# Patient Record
Sex: MALE | Race: WHITE | NOT HISPANIC OR LATINO | URBAN - METROPOLITAN AREA
[De-identification: names, ages, dates, MRNs, and addresses within clinical notes are randomized per-mention and may not be internally consistent; named-entity substitution may affect disease eponyms.]

---

## 2024-11-13 NOTE — PROGRESS NOTES
Per Salas  1990        Subjective     Chief Complaint: Establish Care      History of Present Illness:  Mr. Per Salas is a 34 y.o. male who presents to clinic for establishing care.        History of Present Illness     CHIEF COMPLAINT:  Mr. Salas presents for an annual wellness visit and to establish primary care for ongoing health monitoring.    HPI:  Mr. Salas reports shortness of breath with physical exertion, which he attributes to being overweight. He denies any other current health issues or past medical conditions, except for having ear tubes as a child. His parents have encouraged him to establish primary care to monitor his overall health.    MEDICAL HISTORY:  Mr. Salas has a history of ear tubes placement as a child. Mr. Salas is sexually active and currently uses condoms for contraception. Mr. Salas receives flu vaccines through work.    FAMILY HISTORY:  Family history is significant for father having a myocardial infarction at age 50. His mother has a history of breast cancer. Mr. Salas's maternal uncle has a history of colon cancer, and his maternal grandmother has a history of ovarian cancer.    SURGICAL HISTORY:  Mr. Salas underwent ear tube placement as a child.    SOCIAL HISTORY:  Mr. Salas denies smoking, alcohol consumption, and drug use. He is sexually active with males and uses protection.              Review of Systems   Constitutional:  Negative for fever.   HENT:  Negative for sore throat.    Respiratory:  Negative for shortness of breath.    Cardiovascular:  Negative for chest pain, palpitations and leg swelling.   Gastrointestinal:  Negative for abdominal pain, blood in stool, constipation, diarrhea, nausea and vomiting.   Genitourinary:  Negative for dysuria and hematuria.        PAST HISTORY:     No past medical history on file.    No past surgical history on file.    Family History   Problem Relation Name Age of Onset    Breast cancer Mother      Heart attack  Father      Ovarian cancer Maternal Grandmother      Colon cancer Maternal Uncle         Social History     Socioeconomic History    Marital status: Single   Tobacco Use    Smoking status: Never    Smokeless tobacco: Never   Substance and Sexual Activity    Alcohol use: Not Currently    Drug use: Never    Sexual activity: Yes     Partners: Male     Birth control/protection: Condom     Comment: monogamous     Social Drivers of Health     Financial Resource Strain: Low Risk  (11/8/2024)    Overall Financial Resource Strain (CARDIA)     Difficulty of Paying Living Expenses: Not hard at all   Food Insecurity: No Food Insecurity (11/8/2024)    Hunger Vital Sign     Worried About Running Out of Food in the Last Year: Never true     Ran Out of Food in the Last Year: Never true   Physical Activity: Insufficiently Active (11/8/2024)    Exercise Vital Sign     Days of Exercise per Week: 2 days     Minutes of Exercise per Session: 30 min   Stress: No Stress Concern Present (11/8/2024)    [employer] [place] [employer] - Occupational Stress Questionnaire     Feeling of Stress : Only a little   Housing Stability: Unknown (11/8/2024)    Housing Stability Vital Sign     Unable to Pay for Housing in the Last Year: No       MEDICATIONS & ALLERGIES:     No current outpatient medications on file prior to visit.     No current facility-administered medications on file prior to visit.       Review of patient's allergies indicates:  No Known Allergies    OBJECTIVE:     Vital Signs:  Vitals:    11/15/24 0849   BP: 112/82   BP Location: Right arm   Patient Position: Sitting   Pulse: 80   Resp: 18   Temp: 98.3 °F (36.8 °C)   TempSrc: Oral   SpO2: 98%   Weight: 129.8 kg (286 lb 2.5 oz)   Height: 6' (1.829 m)       Body mass index is 38.81 kg/m².     Physical Exam:  Physical Exam  Vitals and nursing note reviewed.   Constitutional:       General: He is not in acute distress.     Appearance: He is obese. He is not ill-appearing.  "  HENT:      Head: Normocephalic and atraumatic.      Mouth/Throat:      Mouth: Mucous membranes are moist.      Pharynx: Oropharynx is clear. No oropharyngeal exudate or posterior oropharyngeal erythema.   Eyes:      Extraocular Movements: Extraocular movements intact.      Conjunctiva/sclera: Conjunctivae normal.   Cardiovascular:      Rate and Rhythm: Normal rate and regular rhythm.   Pulmonary:      Effort: Pulmonary effort is normal. No respiratory distress.      Breath sounds: Normal breath sounds. No wheezing or rales.   Chest:      Chest wall: No tenderness.   Abdominal:      Palpations: Abdomen is soft.      Tenderness: There is no abdominal tenderness. There is no guarding.   Musculoskeletal:         General: Normal range of motion.      Cervical back: Normal range of motion and neck supple. No tenderness.      Right lower leg: No edema.      Left lower leg: No edema.   Lymphadenopathy:      Cervical: No cervical adenopathy.   Skin:     General: Skin is warm and dry.   Neurological:      Mental Status: He is alert and oriented to person, place, and time.            Laboratory  No results found for: "WBC", "HGB", "HCT", "MCV", "PLT"  No results found for: "GLU", "NA", "K", "CL", "CO2", "BUN", "CREATININE", "CALCIUM", "MG"  No results found for: "INR", "PROTIME"  No results found for: "HGBA1C"  No results for input(s): "POCTGLUCOSE" in the last 72 hours.      Health Maintenance         Date Due Completion Date    Hepatitis C Screening Never done ---    Lipid Panel Never done ---    HIV Screening Never done ---    TETANUS VACCINE Never done ---    COVID-19 Vaccine (4 - 2024-25 season) 09/01/2024 9/30/2021    RSV Vaccine (Age 60+ and Pregnant patients) (1 - 1-dose 75+ series) 03/14/2067 ---            ASSESSMENT & PLAN:   34 y.o. male who was seen today in clinic for establishing care    Annual physical exam  -     Hemoglobin A1C; Future; Expected date: 11/15/2024  -     CBC Without Differential; Future; " Expected date: 11/15/2024  -     Comprehensive Metabolic Panel; Future; Expected date: 11/15/2024  -     TSH; Future; Expected date: 11/15/2024  -     Lipid Panel; Future; Expected date: 11/15/2024  -     Hepatitis C Antibody; Future; Expected date: 11/15/2024  -     HIV 1/2 Ag/Ab (4th Gen); Future; Expected date: 11/15/2024  -     DIPH,PERTUSS(ACEL),TET VAC(PF)(ADULT)(ADACEL)(TDaP)    Class 2 obesity without serious comorbidity with body mass index (BMI) of 38.0 to 38.9 in adult, unspecified obesity type         1. Annual physical exam    2. Class 2 obesity without serious comorbidity with body mass index (BMI) of 38.0 to 38.9 in adult, unspecified obesity type      Fasting labs ordered. Tdap vaccine today.   Discussed importance of diet and exercise.      Assessment & Plan    Assessed patient with no significant current health issues  Considered family history of cardiovascular disease and various cancers    GENERAL ADULT MEDICAL EXAMINATION:  CBC, CMP, cholesterol panel, A1C, hepatitis C screening, HIV screening, and TSH ordered to establish health baseline and screen for potential issues.    WEIGHT MANAGEMENT:  Discussed the importance of diet and exercise for weight management.  Mr. Salas to focus on diet and exercise for weight management.    IMMUNIZATION:  Tetanus vaccine administered.    FOLLOW-UP:  Follow up in 1 year, unless lab results indicate need for earlier appointment.  Contact the office if any issues arise from lab results.             RTC in 1 year or sooner if needed    Juan Carlos Garcia MD  [facility]    This note was generated with the assistance of ambient listening technology. Verbal consent was obtained by the patient and accompanying visitor(s) for the recording of patient appointment to facilitate this note. I attest to having reviewed and edited the generated note for accuracy, though some syntax or spelling errors may persist. Please contact the author of this note for any  clarification.

## 2024-11-15 ENCOUNTER — OFFICE VISIT (OUTPATIENT)
Dept: INTERNAL MEDICINE | Facility: CLINIC | Age: 34
End: 2024-11-15
Payer: COMMERCIAL

## 2024-11-15 ENCOUNTER — LAB VISIT (OUTPATIENT)
Dept: LAB | Facility: HOSPITAL | Age: 34
End: 2024-11-15
Payer: COMMERCIAL

## 2024-11-15 VITALS
HEART RATE: 80 BPM | BODY MASS INDEX: 38.76 KG/M2 | SYSTOLIC BLOOD PRESSURE: 112 MMHG | TEMPERATURE: 98 F | WEIGHT: 286.19 LBS | OXYGEN SATURATION: 98 % | RESPIRATION RATE: 18 BRPM | HEIGHT: 72 IN | DIASTOLIC BLOOD PRESSURE: 82 MMHG

## 2024-11-15 DIAGNOSIS — Z00.00 ANNUAL PHYSICAL EXAM: ICD-10-CM

## 2024-11-15 DIAGNOSIS — Z00.00 ANNUAL PHYSICAL EXAM: Primary | ICD-10-CM

## 2024-11-15 DIAGNOSIS — E66.812 CLASS 2 OBESITY WITHOUT SERIOUS COMORBIDITY WITH BODY MASS INDEX (BMI) OF 38.0 TO 38.9 IN ADULT, UNSPECIFIED OBESITY TYPE: ICD-10-CM

## 2024-11-15 LAB
ALBUMIN SERPL BCP-MCNC: 4.1 G/DL (ref 3.5–5.2)
ALP SERPL-CCNC: 82 U/L (ref 40–150)
ALT SERPL W/O P-5'-P-CCNC: 35 U/L (ref 10–44)
ANION GAP SERPL CALC-SCNC: 8 MMOL/L (ref 8–16)
AST SERPL-CCNC: 23 U/L (ref 10–40)
BILIRUB SERPL-MCNC: 0.4 MG/DL (ref 0.1–1)
BUN SERPL-MCNC: 17 MG/DL (ref 6–20)
CALCIUM SERPL-MCNC: 9.3 MG/DL (ref 8.7–10.5)
CHLORIDE SERPL-SCNC: 107 MMOL/L (ref 95–110)
CHOLEST SERPL-MCNC: 207 MG/DL (ref 120–199)
CHOLEST/HDLC SERPL: 6.1 {RATIO} (ref 2–5)
CO2 SERPL-SCNC: 24 MMOL/L (ref 23–29)
CREAT SERPL-MCNC: 1.4 MG/DL (ref 0.5–1.4)
ERYTHROCYTE [DISTWIDTH] IN BLOOD BY AUTOMATED COUNT: 12.8 % (ref 11.5–14.5)
EST. GFR  (NO RACE VARIABLE): >60 ML/MIN/1.73 M^2
ESTIMATED AVG GLUCOSE: 114 MG/DL (ref 68–131)
GLUCOSE SERPL-MCNC: 101 MG/DL (ref 70–110)
HBA1C MFR BLD: 5.6 % (ref 4–5.6)
HCT VFR BLD AUTO: 48.8 % (ref 40–54)
HCV AB SERPL QL IA: NORMAL
HDLC SERPL-MCNC: 34 MG/DL (ref 40–75)
HDLC SERPL: 16.4 % (ref 20–50)
HGB BLD-MCNC: 15.1 G/DL (ref 14–18)
HIV 1+2 AB+HIV1 P24 AG SERPL QL IA: NORMAL
LDLC SERPL CALC-MCNC: 143 MG/DL (ref 63–159)
MCH RBC QN AUTO: 28 PG (ref 27–31)
MCHC RBC AUTO-ENTMCNC: 30.9 G/DL (ref 32–36)
MCV RBC AUTO: 91 FL (ref 82–98)
NONHDLC SERPL-MCNC: 173 MG/DL
PLATELET # BLD AUTO: 234 K/UL (ref 150–450)
PMV BLD AUTO: 11.1 FL (ref 9.2–12.9)
POTASSIUM SERPL-SCNC: 4.3 MMOL/L (ref 3.5–5.1)
PROT SERPL-MCNC: 7.5 G/DL (ref 6–8.4)
RBC # BLD AUTO: 5.39 M/UL (ref 4.6–6.2)
SODIUM SERPL-SCNC: 139 MMOL/L (ref 136–145)
TRIGL SERPL-MCNC: 150 MG/DL (ref 30–150)
TSH SERPL DL<=0.005 MIU/L-ACNC: 0.81 UIU/ML (ref 0.4–4)
WBC # BLD AUTO: 5.94 K/UL (ref 3.9–12.7)

## 2024-11-15 PROCEDURE — 87389 HIV-1 AG W/HIV-1&-2 AB AG IA: CPT

## 2024-11-15 PROCEDURE — 86803 HEPATITIS C AB TEST: CPT

## 2024-11-15 PROCEDURE — 99999 PR PBB SHADOW E&M-EST. PATIENT-LVL III: CPT | Mod: PBBFAC,,,

## 2024-11-15 PROCEDURE — 80061 LIPID PANEL: CPT

## 2024-11-15 PROCEDURE — 84443 ASSAY THYROID STIM HORMONE: CPT

## 2024-11-15 PROCEDURE — 36415 COLL VENOUS BLD VENIPUNCTURE: CPT | Mod: PO

## 2024-11-15 PROCEDURE — 80053 COMPREHEN METABOLIC PANEL: CPT

## 2024-11-15 PROCEDURE — 83036 HEMOGLOBIN GLYCOSYLATED A1C: CPT

## 2024-11-15 PROCEDURE — 85027 COMPLETE CBC AUTOMATED: CPT

## 2024-12-17 ENCOUNTER — HOSPITAL ENCOUNTER (OUTPATIENT)
Facility: HOSPITAL | Age: 34
Discharge: HOME | End: 2024-12-18
Attending: STUDENT IN AN ORGANIZED HEALTH CARE EDUCATION/TRAINING PROGRAM | Admitting: UROLOGY
Payer: COMMERCIAL

## 2024-12-17 DIAGNOSIS — N20.1 URETERAL STONE: Primary | ICD-10-CM

## 2024-12-17 PROBLEM — N17.9 AKI (ACUTE KIDNEY INJURY): Status: ACTIVE | Noted: 2024-12-17

## 2024-12-17 LAB
ALBUMIN SERPL BCP-MCNC: 4.1 G/DL (ref 3.5–5.2)
ALP SERPL-CCNC: 82 U/L (ref 40–150)
ALT SERPL W/O P-5'-P-CCNC: 32 U/L (ref 10–44)
ANION GAP SERPL CALC-SCNC: 9 MMOL/L (ref 8–16)
AST SERPL-CCNC: 25 U/L (ref 10–40)
BACTERIA #/AREA URNS AUTO: ABNORMAL /HPF
BASOPHILS # BLD AUTO: 0.01 K/UL (ref 0–0.2)
BASOPHILS NFR BLD: 0.1 % (ref 0–1.9)
BILIRUB SERPL-MCNC: 0.5 MG/DL (ref 0.1–1)
BILIRUB UR QL STRIP: NEGATIVE
BUN SERPL-MCNC: 15 MG/DL (ref 6–20)
CALCIUM SERPL-MCNC: 9.2 MG/DL (ref 8.7–10.5)
CHLORIDE SERPL-SCNC: 107 MMOL/L (ref 95–110)
CLARITY UR REFRACT.AUTO: CLEAR
CO2 SERPL-SCNC: 23 MMOL/L (ref 23–29)
COLOR UR AUTO: YELLOW
CREAT SERPL-MCNC: 2 MG/DL (ref 0.5–1.4)
DIFFERENTIAL METHOD BLD: ABNORMAL
EOSINOPHIL # BLD AUTO: 0 K/UL (ref 0–0.5)
EOSINOPHIL NFR BLD: 0.1 % (ref 0–8)
ERYTHROCYTE [DISTWIDTH] IN BLOOD BY AUTOMATED COUNT: 12.7 % (ref 11.5–14.5)
EST. GFR  (NO RACE VARIABLE): 44.6 ML/MIN/1.73 M^2
GLUCOSE SERPL-MCNC: 96 MG/DL (ref 70–110)
GLUCOSE UR QL STRIP: NEGATIVE
HCT VFR BLD AUTO: 46.6 % (ref 40–54)
HGB BLD-MCNC: 15.3 G/DL (ref 14–18)
HGB UR QL STRIP: ABNORMAL
IMM GRANULOCYTES # BLD AUTO: 0.03 K/UL (ref 0–0.04)
IMM GRANULOCYTES NFR BLD AUTO: 0.3 % (ref 0–0.5)
KETONES UR QL STRIP: ABNORMAL
LEUKOCYTE ESTERASE UR QL STRIP: NEGATIVE
LYMPHOCYTES # BLD AUTO: 1.8 K/UL (ref 1–4.8)
LYMPHOCYTES NFR BLD: 15.4 % (ref 18–48)
MCH RBC QN AUTO: 29.2 PG (ref 27–31)
MCHC RBC AUTO-ENTMCNC: 32.8 G/DL (ref 32–36)
MCV RBC AUTO: 89 FL (ref 82–98)
MICROSCOPIC COMMENT: ABNORMAL
MONOCYTES # BLD AUTO: 0.8 K/UL (ref 0.3–1)
MONOCYTES NFR BLD: 6.6 % (ref 4–15)
NEUTROPHILS # BLD AUTO: 8.9 K/UL (ref 1.8–7.7)
NEUTROPHILS NFR BLD: 77.5 % (ref 38–73)
NITRITE UR QL STRIP: NEGATIVE
NON-SQ EPI CELLS #/AREA URNS AUTO: 0 /HPF
NRBC BLD-RTO: 0 /100 WBC
PH UR STRIP: 8 [PH] (ref 5–8)
PLATELET # BLD AUTO: 219 K/UL (ref 150–450)
PMV BLD AUTO: 11 FL (ref 9.2–12.9)
POTASSIUM SERPL-SCNC: 4.2 MMOL/L (ref 3.5–5.1)
PROT SERPL-MCNC: 7.2 G/DL (ref 6–8.4)
PROT UR QL STRIP: ABNORMAL
RBC # BLD AUTO: 5.24 M/UL (ref 4.6–6.2)
RBC #/AREA URNS AUTO: 22 /HPF (ref 0–4)
SODIUM SERPL-SCNC: 139 MMOL/L (ref 136–145)
SP GR UR STRIP: 1.02 (ref 1–1.03)
SQUAMOUS #/AREA URNS AUTO: 0 /HPF
URN SPEC COLLECT METH UR: ABNORMAL
WBC # BLD AUTO: 11.43 K/UL (ref 3.9–12.7)
WBC #/AREA URNS AUTO: 1 /HPF (ref 0–5)

## 2024-12-17 PROCEDURE — 85025 COMPLETE CBC W/AUTO DIFF WBC: CPT | Performed by: EMERGENCY MEDICINE

## 2024-12-17 PROCEDURE — G0378 HOSPITAL OBSERVATION PER HR: HCPCS

## 2024-12-17 PROCEDURE — 96374 THER/PROPH/DIAG INJ IV PUSH: CPT

## 2024-12-17 PROCEDURE — 96375 TX/PRO/DX INJ NEW DRUG ADDON: CPT

## 2024-12-17 PROCEDURE — 99285 EMERGENCY DEPT VISIT HI MDM: CPT | Mod: 25

## 2024-12-17 PROCEDURE — 63600175 PHARM REV CODE 636 W HCPCS

## 2024-12-17 PROCEDURE — 81001 URINALYSIS AUTO W/SCOPE: CPT | Performed by: EMERGENCY MEDICINE

## 2024-12-17 PROCEDURE — 96361 HYDRATE IV INFUSION ADD-ON: CPT

## 2024-12-17 PROCEDURE — 25000003 PHARM REV CODE 250

## 2024-12-17 PROCEDURE — 80053 COMPREHEN METABOLIC PANEL: CPT | Performed by: EMERGENCY MEDICINE

## 2024-12-17 PROCEDURE — 99223 1ST HOSP IP/OBS HIGH 75: CPT | Mod: ,,, | Performed by: UROLOGY

## 2024-12-17 RX ORDER — SODIUM CHLORIDE, SODIUM LACTATE, POTASSIUM CHLORIDE, CALCIUM CHLORIDE 600; 310; 30; 20 MG/100ML; MG/100ML; MG/100ML; MG/100ML
INJECTION, SOLUTION INTRAVENOUS CONTINUOUS
Status: DISCONTINUED | OUTPATIENT
Start: 2024-12-17 | End: 2024-12-18 | Stop reason: HOSPADM

## 2024-12-17 RX ORDER — DIPHENHYDRAMINE HCL 25 MG
25 CAPSULE ORAL EVERY 12 HOURS PRN
Status: DISCONTINUED | OUTPATIENT
Start: 2024-12-17 | End: 2024-12-18 | Stop reason: HOSPADM

## 2024-12-17 RX ORDER — ONDANSETRON HYDROCHLORIDE 2 MG/ML
4 INJECTION, SOLUTION INTRAVENOUS
Status: COMPLETED | OUTPATIENT
Start: 2024-12-17 | End: 2024-12-17

## 2024-12-17 RX ORDER — ONDANSETRON HYDROCHLORIDE 2 MG/ML
4 INJECTION, SOLUTION INTRAVENOUS EVERY 6 HOURS PRN
Status: DISCONTINUED | OUTPATIENT
Start: 2024-12-17 | End: 2024-12-18 | Stop reason: HOSPADM

## 2024-12-17 RX ORDER — SODIUM CHLORIDE 0.9 % (FLUSH) 0.9 %
10 SYRINGE (ML) INJECTION
Status: DISCONTINUED | OUTPATIENT
Start: 2024-12-17 | End: 2024-12-18 | Stop reason: HOSPADM

## 2024-12-17 RX ORDER — TALC
6 POWDER (GRAM) TOPICAL NIGHTLY PRN
Status: DISCONTINUED | OUTPATIENT
Start: 2024-12-17 | End: 2024-12-18 | Stop reason: HOSPADM

## 2024-12-17 RX ORDER — ACETAMINOPHEN 325 MG/1
650 TABLET ORAL EVERY 6 HOURS PRN
Status: DISCONTINUED | OUTPATIENT
Start: 2024-12-17 | End: 2024-12-18 | Stop reason: HOSPADM

## 2024-12-17 RX ORDER — MORPHINE SULFATE 4 MG/ML
4 INJECTION, SOLUTION INTRAMUSCULAR; INTRAVENOUS ONCE AS NEEDED
Status: COMPLETED | OUTPATIENT
Start: 2024-12-17 | End: 2024-12-17

## 2024-12-17 RX ORDER — TAMSULOSIN HYDROCHLORIDE 0.4 MG/1
0.4 CAPSULE ORAL NIGHTLY
Status: DISCONTINUED | OUTPATIENT
Start: 2024-12-17 | End: 2024-12-18 | Stop reason: HOSPADM

## 2024-12-17 RX ORDER — KETOROLAC TROMETHAMINE 30 MG/ML
10 INJECTION, SOLUTION INTRAMUSCULAR; INTRAVENOUS ONCE AS NEEDED
Status: COMPLETED | OUTPATIENT
Start: 2024-12-17 | End: 2024-12-17

## 2024-12-17 RX ORDER — OXYCODONE HYDROCHLORIDE 5 MG/1
5 TABLET ORAL EVERY 4 HOURS PRN
Status: DISCONTINUED | OUTPATIENT
Start: 2024-12-17 | End: 2024-12-18 | Stop reason: HOSPADM

## 2024-12-17 RX ADMIN — SODIUM CHLORIDE 1000 ML: 9 INJECTION, SOLUTION INTRAVENOUS at 06:12

## 2024-12-17 RX ADMIN — KETOROLAC TROMETHAMINE 10 MG: 30 INJECTION, SOLUTION INTRAMUSCULAR; INTRAVENOUS at 08:12

## 2024-12-17 RX ADMIN — SODIUM CHLORIDE, POTASSIUM CHLORIDE, SODIUM LACTATE AND CALCIUM CHLORIDE: 600; 310; 30; 20 INJECTION, SOLUTION INTRAVENOUS at 10:12

## 2024-12-17 RX ADMIN — MORPHINE SULFATE 4 MG: 4 INJECTION INTRAVENOUS at 07:12

## 2024-12-17 RX ADMIN — TAMSULOSIN HYDROCHLORIDE 0.4 MG: 0.4 CAPSULE ORAL at 09:12

## 2024-12-17 RX ADMIN — ONDANSETRON 4 MG: 2 INJECTION INTRAMUSCULAR; INTRAVENOUS at 06:12

## 2024-12-17 NOTE — FIRST PROVIDER EVALUATION
Medical screening examination initiated.  I have conducted a focused provider triage encounter, findings are as follows:    Brief history of present illness:  Sudden onset left flank pain, nausea, vomiting. Similar to prior kidney stone but worse than before    There were no vitals filed for this visit.    Pertinent physical exam:  Uncomfortable appearing, NAD    Brief workup plan:  Analgesia, labs, ct    Preliminary workup initiated; this workup will be continued and followed by the physician or advanced practice provider that is assigned to the patient when roomed.

## 2024-12-17 NOTE — ED TRIAGE NOTES
Per Salas, a 34 y.o. male presents to the ED w/ complaint of left flank pain, started this morning    Triage note:  Chief Complaint   Patient presents with    Flank Pain     L flank pain, vomited hx kidney stones     Review of patient's allergies indicates:  No Known Allergies  No past medical history on file.      APPEARANCE: awake and alert in NAD. PAIN  7/10  SKIN: warm, dry and intact. No breakdown or bruising.  MUSCULOSKELETAL: Patient moving all extremities spontaneously, no obvious swelling or deformities noted. Ambulates independently.  RESPIRATORY: Denies shortness of breath.Respirations unlabored.   CARDIAC: Denies CP, 2+ distal pulses; no peripheral edema  ABDOMEN: S/ND/NT, endorses nausea  : voids spontaneously, denies difficulty  Neurologic: AAO x 4; follows commands equal strength in all extremities; denies numbness/tingling. Denies dizziness

## 2024-12-18 ENCOUNTER — ANESTHESIA (OUTPATIENT)
Dept: SURGERY | Facility: HOSPITAL | Age: 34
End: 2024-12-18
Payer: COMMERCIAL

## 2024-12-18 ENCOUNTER — ANESTHESIA EVENT (OUTPATIENT)
Dept: SURGERY | Facility: HOSPITAL | Age: 34
End: 2024-12-18
Payer: COMMERCIAL

## 2024-12-18 VITALS
RESPIRATION RATE: 18 BRPM | DIASTOLIC BLOOD PRESSURE: 83 MMHG | WEIGHT: 259.94 LBS | BODY MASS INDEX: 35.21 KG/M2 | TEMPERATURE: 98 F | SYSTOLIC BLOOD PRESSURE: 131 MMHG | HEART RATE: 70 BPM | HEIGHT: 72 IN | OXYGEN SATURATION: 98 %

## 2024-12-18 LAB
ANION GAP SERPL CALC-SCNC: 6 MMOL/L (ref 8–16)
BASOPHILS # BLD AUTO: 0.02 K/UL (ref 0–0.2)
BASOPHILS NFR BLD: 0.2 % (ref 0–1.9)
BUN SERPL-MCNC: 15 MG/DL (ref 6–20)
CALCIUM SERPL-MCNC: 8.7 MG/DL (ref 8.7–10.5)
CHLORIDE SERPL-SCNC: 108 MMOL/L (ref 95–110)
CO2 SERPL-SCNC: 25 MMOL/L (ref 23–29)
CREAT SERPL-MCNC: 1.9 MG/DL (ref 0.5–1.4)
DIFFERENTIAL METHOD BLD: ABNORMAL
EOSINOPHIL # BLD AUTO: 0.1 K/UL (ref 0–0.5)
EOSINOPHIL NFR BLD: 0.8 % (ref 0–8)
ERYTHROCYTE [DISTWIDTH] IN BLOOD BY AUTOMATED COUNT: 13 % (ref 11.5–14.5)
EST. GFR  (NO RACE VARIABLE): 47.5 ML/MIN/1.73 M^2
GLUCOSE SERPL-MCNC: 84 MG/DL (ref 70–110)
HCT VFR BLD AUTO: 45.1 % (ref 40–54)
HGB BLD-MCNC: 14.2 G/DL (ref 14–18)
IMM GRANULOCYTES # BLD AUTO: 0.02 K/UL (ref 0–0.04)
IMM GRANULOCYTES NFR BLD AUTO: 0.2 % (ref 0–0.5)
LYMPHOCYTES # BLD AUTO: 1.7 K/UL (ref 1–4.8)
LYMPHOCYTES NFR BLD: 21.1 % (ref 18–48)
MCH RBC QN AUTO: 28.5 PG (ref 27–31)
MCHC RBC AUTO-ENTMCNC: 31.5 G/DL (ref 32–36)
MCV RBC AUTO: 90 FL (ref 82–98)
MONOCYTES # BLD AUTO: 0.7 K/UL (ref 0.3–1)
MONOCYTES NFR BLD: 9 % (ref 4–15)
NEUTROPHILS # BLD AUTO: 5.7 K/UL (ref 1.8–7.7)
NEUTROPHILS NFR BLD: 68.7 % (ref 38–73)
NRBC BLD-RTO: 0 /100 WBC
PLATELET # BLD AUTO: 176 K/UL (ref 150–450)
PMV BLD AUTO: 11.1 FL (ref 9.2–12.9)
POTASSIUM SERPL-SCNC: 4 MMOL/L (ref 3.5–5.1)
RBC # BLD AUTO: 4.99 M/UL (ref 4.6–6.2)
SODIUM SERPL-SCNC: 139 MMOL/L (ref 136–145)
WBC # BLD AUTO: 8.26 K/UL (ref 3.9–12.7)

## 2024-12-18 PROCEDURE — 25000003 PHARM REV CODE 250

## 2024-12-18 PROCEDURE — 74420 UROGRAPHY RTRGR +-KUB: CPT | Mod: 26,,, | Performed by: UROLOGY

## 2024-12-18 PROCEDURE — 71000016 HC POSTOP RECOV ADDL HR: Performed by: UROLOGY

## 2024-12-18 PROCEDURE — 96376 TX/PRO/DX INJ SAME DRUG ADON: CPT

## 2024-12-18 PROCEDURE — 94761 N-INVAS EAR/PLS OXIMETRY MLT: CPT

## 2024-12-18 PROCEDURE — C2617 STENT, NON-COR, TEM W/O DEL: HCPCS | Performed by: UROLOGY

## 2024-12-18 PROCEDURE — 80048 BASIC METABOLIC PNL TOTAL CA: CPT

## 2024-12-18 PROCEDURE — 63600175 PHARM REV CODE 636 W HCPCS

## 2024-12-18 PROCEDURE — 37000008 HC ANESTHESIA 1ST 15 MINUTES: Performed by: UROLOGY

## 2024-12-18 PROCEDURE — D9220A PRA ANESTHESIA: Mod: ,,, | Performed by: ANESTHESIOLOGY

## 2024-12-18 PROCEDURE — 96361 HYDRATE IV INFUSION ADD-ON: CPT

## 2024-12-18 PROCEDURE — 36000706: Performed by: UROLOGY

## 2024-12-18 PROCEDURE — 85025 COMPLETE CBC W/AUTO DIFF WBC: CPT

## 2024-12-18 PROCEDURE — 36415 COLL VENOUS BLD VENIPUNCTURE: CPT

## 2024-12-18 PROCEDURE — 99900035 HC TECH TIME PER 15 MIN (STAT)

## 2024-12-18 PROCEDURE — 37000009 HC ANESTHESIA EA ADD 15 MINS: Performed by: UROLOGY

## 2024-12-18 PROCEDURE — 71000015 HC POSTOP RECOV 1ST HR: Performed by: UROLOGY

## 2024-12-18 PROCEDURE — 71000033 HC RECOVERY, INTIAL HOUR: Performed by: UROLOGY

## 2024-12-18 PROCEDURE — 94799 UNLISTED PULMONARY SVC/PX: CPT

## 2024-12-18 PROCEDURE — 52332 CYSTOSCOPY AND TREATMENT: CPT | Mod: LT,,, | Performed by: UROLOGY

## 2024-12-18 PROCEDURE — 36000707: Performed by: UROLOGY

## 2024-12-18 PROCEDURE — C1769 GUIDE WIRE: HCPCS | Performed by: UROLOGY

## 2024-12-18 PROCEDURE — G0378 HOSPITAL OBSERVATION PER HR: HCPCS

## 2024-12-18 DEVICE — STENT URETERAL UNIV 6FR 28CM: Type: IMPLANTABLE DEVICE | Site: URETER | Status: FUNCTIONAL

## 2024-12-18 RX ORDER — LIDOCAINE HYDROCHLORIDE 10 MG/ML
INJECTION, SOLUTION INTRAVENOUS
Status: DISCONTINUED | OUTPATIENT
Start: 2024-12-18 | End: 2024-12-18

## 2024-12-18 RX ORDER — MIDAZOLAM HYDROCHLORIDE 5 MG/ML
INJECTION INTRAMUSCULAR; INTRAVENOUS
Status: DISCONTINUED | OUTPATIENT
Start: 2024-12-18 | End: 2024-12-18

## 2024-12-18 RX ORDER — TAMSULOSIN HYDROCHLORIDE 0.4 MG/1
0.4 CAPSULE ORAL DAILY
Qty: 30 CAPSULE | Refills: 0 | Status: SHIPPED | OUTPATIENT
Start: 2024-12-18 | End: 2025-01-22

## 2024-12-18 RX ORDER — OXYBUTYNIN CHLORIDE 5 MG/1
5 TABLET ORAL 3 TIMES DAILY PRN
Qty: 30 TABLET | Refills: 0 | Status: SHIPPED | OUTPATIENT
Start: 2024-12-18 | End: 2025-12-18

## 2024-12-18 RX ORDER — PROPOFOL 10 MG/ML
VIAL (ML) INTRAVENOUS
Status: DISCONTINUED | OUTPATIENT
Start: 2024-12-18 | End: 2024-12-18

## 2024-12-18 RX ORDER — GLUCAGON 1 MG
1 KIT INJECTION
Status: DISCONTINUED | OUTPATIENT
Start: 2024-12-18 | End: 2024-12-18 | Stop reason: HOSPADM

## 2024-12-18 RX ORDER — KETAMINE HYDROCHLORIDE 100 MG/ML
INJECTION, SOLUTION INTRAMUSCULAR; INTRAVENOUS
Status: DISCONTINUED | OUTPATIENT
Start: 2024-12-18 | End: 2024-12-18

## 2024-12-18 RX ORDER — ONDANSETRON HYDROCHLORIDE 2 MG/ML
4 INJECTION, SOLUTION INTRAVENOUS DAILY PRN
Status: DISCONTINUED | OUTPATIENT
Start: 2024-12-18 | End: 2024-12-18 | Stop reason: HOSPADM

## 2024-12-18 RX ORDER — SODIUM CHLORIDE 0.9 % (FLUSH) 0.9 %
10 SYRINGE (ML) INJECTION
Status: DISCONTINUED | OUTPATIENT
Start: 2024-12-18 | End: 2024-12-18 | Stop reason: HOSPADM

## 2024-12-18 RX ORDER — CEFAZOLIN SODIUM 1 G/3ML
INJECTION, POWDER, FOR SOLUTION INTRAMUSCULAR; INTRAVENOUS
Status: DISCONTINUED | OUTPATIENT
Start: 2024-12-18 | End: 2024-12-18

## 2024-12-18 RX ORDER — FENTANYL CITRATE 50 UG/ML
INJECTION, SOLUTION INTRAMUSCULAR; INTRAVENOUS
Status: DISCONTINUED | OUTPATIENT
Start: 2024-12-18 | End: 2024-12-18

## 2024-12-18 RX ADMIN — FENTANYL CITRATE 25 MCG: 50 INJECTION, SOLUTION INTRAMUSCULAR; INTRAVENOUS at 01:12

## 2024-12-18 RX ADMIN — ONDANSETRON 4 MG: 2 INJECTION INTRAMUSCULAR; INTRAVENOUS at 01:12

## 2024-12-18 RX ADMIN — LIDOCAINE HYDROCHLORIDE 60 MG: 10 INJECTION, SOLUTION INTRAVENOUS at 01:12

## 2024-12-18 RX ADMIN — SODIUM CHLORIDE, POTASSIUM CHLORIDE, SODIUM LACTATE AND CALCIUM CHLORIDE: 600; 310; 30; 20 INJECTION, SOLUTION INTRAVENOUS at 08:12

## 2024-12-18 RX ADMIN — MIDAZOLAM HYDROCHLORIDE 2 MG: 5 INJECTION, SOLUTION INTRAMUSCULAR; INTRAVENOUS at 01:12

## 2024-12-18 RX ADMIN — CEFAZOLIN 2 G: 330 INJECTION, POWDER, FOR SOLUTION INTRAMUSCULAR; INTRAVENOUS at 01:12

## 2024-12-18 RX ADMIN — KETAMINE HYDROCHLORIDE 20 MG: 100 INJECTION INTRAMUSCULAR; INTRAVENOUS at 01:12

## 2024-12-18 RX ADMIN — OXYCODONE HYDROCHLORIDE 5 MG: 5 TABLET ORAL at 03:12

## 2024-12-18 RX ADMIN — SODIUM CHLORIDE: 0.9 INJECTION, SOLUTION INTRAVENOUS at 01:12

## 2024-12-18 RX ADMIN — PROPOFOL 30 MG: 10 INJECTION, EMULSION INTRAVENOUS at 01:12

## 2024-12-18 RX ADMIN — PROPOFOL 175 MCG/KG/MIN: 10 INJECTION, EMULSION INTRAVENOUS at 01:12

## 2024-12-18 NOTE — DISCHARGE SUMMARY
[facility] ([facility])  Urology  Discharge Summary      Patient Name: Per Salas  MRN: [id number]  Admission Date: 12/17/2024  Hospital Length of Stay: 0 days  Discharge Date and Time:  12/18/2024 2:08 PM  Attending Physician: Tj Rodrigues MD   Discharging Provider: Kumar Bernard MD  Primary Care Physician: Juan Carlos Garcia MD    HPI:   34-year-old male history of nephrolithiasis presenting to the emergency department due to sudden onset of left flank pain with multiple episodes emesis. Patient reports symptoms began this morning around 6:40 a.m..     On assessment, patient is afebrile, VSS. Patient complaining of left flank pain, nausea, vomiting. Patient denies fevers, chills, dysuria, hematuria. Reports previously p[passing kidney stones but denies prior stone surgeries.   WBC 11.4. UA non concerning for infection.   Cr 2.0 from baseline 1.4  CTRSS with 1 cm proximal left ureteral stone with hydronephrosis. Bilateral nephrolithiasis. No right ureteral stones.      Procedure(s) (LRB):  CYSTOSCOPY, WITH URETERAL STENT INSERTION (Left)  PYELOGRAM, RETROGRADE (Left)     Indwelling Lines/Drains at time of discharge:   Lines/Drains/Airways       None                   Hospital Course (synopsis of major diagnoses, care, treatment, and services provided during the course of the hospital stay):    Patient was admitted to the hospital for procedures as described above, please see operative note for full details. Patient tolerated the procedure well, and was taken to PACU postoperatively for observation during their continued recovery from anesthesia. After an appropriate duration of observation, patient was deemed stable for transfer to the floor to continue their recovery. The postoperative course was largely normal. Patient was able to void and ambulate normally. Patient was able to tolerate prescribed diet. Nausea and pain were controlled with oral medications. Patient was deemed stable for discharge on HD 1  and was discharged with appropriate medications, instructions, and follow up.      Medications and instructions as below.  For more thorough information, please refer to the hospital record and operative report.    Consults:   Consults (From admission, onward)          Status Ordering Provider     Inpatient consult to Urology  Once        Provider:  (Not yet assigned)    MARIA Jones            Significant Diagnostic Studies:     Pending Diagnostic Studies:       Procedure Component Value Units Date/Time    SURG FL Surgery Fluoro Usage [[id number]]     Order Status: Sent Lab Status: No result             Final Active Diagnoses:    Diagnosis Date Noted POA    PRINCIPAL PROBLEM:  Ureteral stone [N20.1] 12/17/2024 Yes    FACUNDO (acute kidney injury) [N17.9] 12/17/2024 Yes      Problems Resolved During this Admission:       Discharged Condition: good    Disposition: Home or Self Care    Follow Up:   Follow-up Information       Tj Rodrigues MD Follow up in 1 week(s).    Specialty: Urology  Contact information:  [address]  5th Floor  [facility] [address]  [phone]                           Patient Instructions:      No driving until:   Order Comments: No driving while taking opioid pain medications.     Notify your health care provider if you experience any of the following:  temperature >100.4     Notify your health care provider if you experience any of the following:  persistent nausea and vomiting or diarrhea     Notify your health care provider if you experience any of the following:  severe uncontrolled pain     Notify your health care provider if you experience any of the following:  difficulty breathing or increased cough     Notify your health care provider if you experience any of the following:  severe persistent headache     Notify your health care provider if you experience any of the following:  persistent dizziness, light-headedness, or visual disturbances     Notify your health  care provider if you experience any of the following:  increased confusion or weakness     Activity as tolerated     Medications:  Reconciled Home Medications:      Medication List        START taking these medications      oxybutynin 5 MG Tab  Commonly known as: DITROPAN  Take 1 tablet (5 mg total) by mouth 3 (three) times daily as needed (bladder spasms).            CONTINUE taking these medications      ketorolac 10 mg tablet  Commonly known as: TORADOL  Take 1 tablet (10 mg total) by mouth every 6 (six) hours as needed for pain.     ondansetron 8 MG Tbdl  Commonly known as: ZOFRAN-ODT  Dissolve 1 tablet (8 mg total) by mouth every 8 (eight) hours as needed for nausea.     tamsulosin 0.4 mg Cap  Commonly known as: FLOMAX  Take 1 capsule (0.4 mg total) by mouth once daily.              Time spent on the discharge of patient: 15 minutes    Kumar Bernard MD  Urology  [facility] - Surgery (2nd Fl)

## 2024-12-18 NOTE — HPI
34-year-old male history of nephrolithiasis presenting to the emergency department due to sudden onset of left flank pain with multiple episodes emesis. Patient reports symptoms began this morning around 6:40 a.m..     On assessment, patient is afebrile, VSS. Patient complaining of left flank pain, nausea, vomiting. Patient denies fevers, chills, dysuria, hematuria. Reports previously p[passing kidney stones but denies prior stone surgeries.   WBC 11.4. UA non concerning for infection.   Cr 2.0 from baseline 1.4  CTRSS with 1 cm proximal left ureteral stone with hydronephrosis. Bilateral nephrolithiasis. No right ureteral stones.

## 2024-12-18 NOTE — NURSING TRANSFER
Nursing Transfer Note      12/18/2024   3:39 PM    Nurse giving handoff:Britney RN PACU  Nurse receiving handoff:Padmaja DAVENPORT    Reason patient is being transferred: MD order    Transfer To: 529    Transfer via stretcher      Transported by PCT    Order for Tele Monitor? No      Patient belongings transferred with patient: No    Chart send with patient: Yes    Notified: mother    Patient reassessed at: 1515 (date, time)  1  Upon arrival to floor: call bell in reach and bed in lowest position   Brow Lift Text: A midfrontal incision was made medially to the defect to allow access to the tissues just superior to the left eyebrow. Following careful dissection inferiorly in a supraperiosteal plane to the level of the left eyebrow, several 3-0 monocryl sutures were used to resuspend the eyebrow orbicularis oculi muscular unit to the superior frontal bone periosteum. This resulted in an appropriate reapproximation of static eyebrow symmetry and correction of the left brow ptosis.

## 2024-12-18 NOTE — ANESTHESIA PREPROCEDURE EVALUATION
[facility]  Anesthesia Pre-Operative Evaluation     Patient Name: Per Salas  YOB: 1990  MRN: [id number]  [facility]: [id number]      Code Status: Full Code   Date of Procedure: 12/18/2024  Anesthesia: Choice Procedure: Procedure(s) (LRB):  CYSTOSCOPY, WITH URETERAL STENT INSERTION (Left)  Pre-Operative Diagnosis: Ureteral stone [N20.1]  Proceduralist: Surgeons and Role:     * Tj Rodrigues MD - Primary     * Kumar Bernard MD - Resident - Assisting          SUBJECTIVE:     Pre-operative evaluation for Procedure(s) (LRB):  CYSTOSCOPY, WITH URETERAL STENT INSERTION (Left)     12/18/2024      34-year-old male history of nephrolithiasis presenting to the emergency department due to sudden onset of left flank pain with multiple episodes emesis. Patient reports symptoms began this morning around 6:40 a.m..     On assessment, patient is afebrile, VSS. Patient complaining of left flank pain, nausea, vomiting. Patient denies fevers, chills, dysuria, hematuria. Reports previously p[passing kidney stones but denies prior stone surgeries.   WBC 11.4. UA non concerning for infection.   Cr 2.0 from baseline 1.4  CTRSS with 1 cm proximal left ureteral stone with hydronephrosis. Bilateral nephrolithiasis. No right ureteral stones. .     Patient now presents for the above procedure(s).       Anticoagulants   Medication Route Frequency        ________________________________________  No results found for this or any previous visit.    ________________________________________    Prev airway: None documented.            LDA:        Peripheral IV - Single Lumen 12/17/24 1633 18 G Left Antecubital (Active)   Site Assessment Clean;Dry;Intact 12/18/24 0703   Extremity Assessment Distal to IV No abnormal discoloration 12/18/24 0703   Line Status Infusing 12/18/24 0703   Dressing Status Clean;Dry;Intact 12/18/24 0703   Dressing Intervention Integrity maintained 12/18/24  0703   Number of days: 0       Drips:    lactated ringers   Intravenous Continuous 200 mL/hr at 12/18/24 0833 New Bag at 12/18/24 0833       Patient Active Problem List   Diagnosis    Class 2 obesity with body mass index (BMI) of 38.0 to 38.9 in adult    Ureteral stone    FACUNDO (acute kidney injury)       Review of patient's allergies indicates:  No Known Allergies    Current Inpatient Medications:    tamsulosin  0.4 mg Oral QHS       No current facility-administered medications on file prior to encounter.     Current Outpatient Medications on File Prior to Encounter   Medication Sig Dispense Refill    ketorolac (TORADOL) 10 mg tablet Take 1 tablet (10 mg total) by mouth every 6 (six) hours as needed for pain. 20 tablet 0    ondansetron (ZOFRAN-ODT) 8 MG TbDL Dissolve 1 tablet (8 mg total) by mouth every 8 (eight) hours as needed for nausea. 10 tablet 1    tamsulosin (FLOMAX) 0.4 mg Cap Take 1 capsule (0.4 mg total) by mouth once daily for 7 days. 7 capsule 0       History reviewed. No pertinent surgical history.    Social History:  Tobacco Use: Low Risk  (12/17/2024)    Patient History     Smoking Tobacco Use: Never     Smokeless Tobacco Use: Never     Passive Exposure: Not on file       Alcohol Use: Not At Risk (11/8/2024)    AUDIT-C     Frequency of Alcohol Consumption: Never     Average Number of Drinks: Patient does not drink     Frequency of Binge Drinking: Never       OBJECTIVE:     Vital Signs Range:  BMI Readings from Last 1 Encounters:   12/17/24 35.25 kg/m²       Temp:  [36.9 °C (98.4 °F)-37 °C (98.6 °F)]   Pulse:  [67-89]   Resp:  [17-18]   BP: (102-126)/(55-73)   SpO2:  [98 %-99 %]        Significant Labs:        Component Value Date/Time    WBC 8.26 12/18/2024 0613    HGB 14.2 12/18/2024 0613    HCT 45.1 12/18/2024 0613     12/18/2024 0613     12/18/2024 0613    K 4.0 12/18/2024 0613     12/18/2024 0613    CO2 25 12/18/2024 0613    GLU 84 12/18/2024 0613    BUN 15 12/18/2024 0613     CREATININE 1.9 (H) 12/18/2024 0613    CALCIUM 8.7 12/18/2024 0613    ALBUMIN 4.1 12/17/2024 1642    PROT 7.2 12/17/2024 1642    ALKPHOS 82 12/17/2024 1642    BILITOT 0.5 12/17/2024 1642    AST 25 12/17/2024 1642    ALT 32 12/17/2024 1642    HGBA1C 5.6 11/15/2024 0949        Please see Results Review for additional labs.     Diagnostic Studies: No relevant studies.    EKG:   No results found for this or any previous visit.    ECHO:  See subjective, if available.      ASSESSMENT/PLAN:           Pre-op Assessment    I have reviewed the Patient Summary Reports.    I have reviewed the NPO Status.   I have reviewed the Medications.     Review of Systems  Anesthesia Hx:  No problems with previous Anesthesia   History of prior surgery of interest to airway management or planning:  Previous anesthesia: General        Denies Family Hx of Anesthesia complications.     Renal/:  Chronic Renal Disease        Kidney Function/Disease                 Physical Exam  General: Well nourished, Cooperative, Alert and Oriented    Airway:  Mallampati: I   Mouth Opening: Normal  TM Distance: Normal  Tongue: Normal  Neck ROM: Normal ROM    Dental:  Intact    Chest/Lungs:  Clear to auscultation, Normal Respiratory Rate    Heart:  Rate: Normal  Rhythm: Regular Rhythm  Sounds: Normal        Anesthesia Plan  Type of Anesthesia, risks & benefits discussed:    Anesthesia Type: Gen Natural Airway, MAC  Intra-op Monitoring Plan: Standard ASA Monitors  Post Op Pain Control Plan: multimodal analgesia and IV/PO Opioids PRN  Induction:  IV  Airway Plan: , Awake  Informed Consent: Informed consent signed with the Patient and all parties understand the risks and agree with anesthesia plan.  All questions answered. Patient consented to blood products? No  ASA Score: 2  Day of Surgery Review of History & Physical: H&P Update referred to the surgeon/provider.    Ready For Surgery From Anesthesia Perspective.     .

## 2024-12-18 NOTE — CONSULTS
[facility] Dept  Urology  Consult Note    Patient Name: Per Salas  MRN: [id number]  Admission Date: 12/17/2024  Hospital Length of Stay: 0   Code Status: No Order   Attending Provider: Tylor Orona MD   Consulting Provider: JONATHON FREEDMAN MD  Primary Care Physician: Juan Carlos Garcia MD  Principal Problem:<principal problem not specified>    Inpatient consult to Urology  Consult performed by: Jonathon Freedman MD  Consult ordered by: Mary Díaz PA-C  Reason for consult: Ureteral stone, FACUNDO        Subjective:     HPI:  34-year-old male history of nephrolithiasis presenting to the emergency department due to sudden onset of left flank pain with multiple episodes emesis. Patient reports symptoms began this morning around 6:40 a.m..     On assessment, patient is afebrile, VSS. Patient complaining of left flank pain, nausea, vomiting. Patient denies fevers, chills, dysuria, hematuria. Reports previously p[passing kidney stones but denies prior stone surgeries.   WBC 11.4. UA non concerning for infection.   Cr 2.0 from baseline 1.4  CTRSS with 1 cm proximal left ureteral stone with hydronephrosis. Bilateral nephrolithiasis. No right ureteral stones.     History reviewed. No pertinent past medical history.    History reviewed. No pertinent surgical history.    Review of patient's allergies indicates:  No Known Allergies    Family History       Problem Relation (Age of Onset)    Breast cancer Mother    Colon cancer Maternal Uncle    Heart attack Father    Ovarian cancer Maternal Grandmother            Tobacco Use    Smoking status: Never    Smokeless tobacco: Never   Substance and Sexual Activity    Alcohol use: Not Currently    Drug use: Never    Sexual activity: Yes     Partners: Male     Birth control/protection: Condom     Comment: monogamous       Review of Systems   Constitutional:  Negative for appetite change, chills and fever.   HENT:  Negative for congestion and sore throat.     Respiratory:  Negative for cough and wheezing.    Gastrointestinal:  Negative for abdominal pain, nausea and vomiting.   Genitourinary:  Positive for flank pain. Negative for dysuria and hematuria.   Neurological:  Negative for headaches.   Psychiatric/Behavioral:  Negative for agitation.        Objective:     Temp:  [97.9 °F (36.6 °C)-98.1 °F (36.7 °C)] 97.9 °F (36.6 °C)  Pulse:  [] 82  Resp:  [17-20] 17  SpO2:  [97 %-98 %] 98 %  BP: (126-140)/(77-85) 126/77  Weight: 117.9 kg (260 lb)  Body mass index is 35.26 kg/m².    Date 12/17/24 0700 - 12/18/24 0659   Shift 2400-8369 6430-5469 7283-5860 24 Hour Total   INTAKE   IV Piggyback  1000  1000   Shift Total(mL/kg)  1000(8.5)  1000(8.5)   OUTPUT   Shift Total(mL/kg)       Weight (kg)  117.9 117.9 117.9          Drains       None                    Physical Exam  Constitutional:       General: He is not in acute distress.  HENT:      Head: Normocephalic.   Cardiovascular:      Rate and Rhythm: Normal rate.   Pulmonary:      Effort: Pulmonary effort is normal.   Abdominal:      General: There is no distension.      Palpations: Abdomen is soft.      Tenderness: There is no abdominal tenderness. There is left CVA tenderness. There is no right CVA tenderness.   Musculoskeletal:         General: Normal range of motion.   Skin:     General: Skin is warm and dry.   Neurological:      Mental Status: He is alert.      Coordination: Coordination normal.   Psychiatric:         Behavior: Behavior normal.          Significant Labs:    BMP:  Recent Labs   Lab 12/17/24  1642      K 4.2      CO2 23   BUN 15   CREATININE 2.0*   CALCIUM 9.2       CBC:  Recent Labs   Lab 12/17/24  1642   WBC 11.43   HGB 15.3   HCT 46.6          All pertinent labs results from the past 24 hours have been reviewed.    Significant Imaging:  All pertinent imaging results/findings from the past 24 hours have been reviewed.                    Assessment and Plan:     Ureteral  stone  Per Salas is a 34 y.o. M with left ureteral stone and FACUNDO    - Admit to observation under Urology service.  - I discussed with the patient that they have an obstructing ureteral stone and an FACUNDO   - Plan for cystoscopy with ureteral stent placement tomorrow   - NPO at midnight  - IVF  - Pain control  - Nausea control  - Flomax  - Strain all urine          VTE Risk Mitigation (From admission, onward)      None            Thank you for your consult. I will follow-up with patient. Please contact us if you have any additional questions.    JONATHON BEAR MD  Urology  [facility]

## 2024-12-18 NOTE — ASSESSMENT & PLAN NOTE
Per Salas is a 34 y.o. M with left ureteral stone and FACUNDO    - f/u Cr this AM.  If does not anival, will plan for left ureteral stent today   - I discussed with the patient that they have an obstructing ureteral stone and an FACUNDO   - NPO  - will obtain consent   - IVF  - Pain control  - Nausea control  - Flomax  - Strain all urine

## 2024-12-18 NOTE — ED PROVIDER NOTES
Encounter Date: 12/17/2024       History     Chief Complaint   Patient presents with    Flank Pain     L flank pain, vomited hx kidney stones     This has in the emergent presentation of a 34-year-old male history of nephrolithiasis presenting to the emergency department due to sudden onset of left flank pain with multiple episodes emesis.  Patient reports symptoms began this morning around 6:40 a.m. he states it felt as if his back froze up, like a spasm.  He presented to work here at [employer] in which he had continued pain he began to experience emesis.  Patient reports he has taken 1 p.o. of Zofran and Toradol which did provide him with relief for a few hours however symptoms have returned.  He has urinated twice without difficulty such as hesitancy frequency dysuria or hematuria.  Denies fevers or chills.      Review of patient's allergies indicates:  No Known Allergies  History reviewed. No pertinent past medical history.  History reviewed. No pertinent surgical history.  Family History   Problem Relation Name Age of Onset    Breast cancer Mother      Heart attack Father      Ovarian cancer Maternal Grandmother      Colon cancer Maternal Uncle       Social History     Tobacco Use    Smoking status: Never    Smokeless tobacco: Never   Substance Use Topics    Alcohol use: Not Currently    Drug use: Never     Review of Systems  See HPI  Physical Exam     Initial Vitals [12/17/24 1554]   BP Pulse Resp Temp SpO2   (!) 140/85 104 20 98.1 °F (36.7 °C) 97 %      MAP       --         Physical Exam    Vitals reviewed.  Constitutional: He appears well-developed.   HENT:   Head: Normocephalic and atraumatic.   Eyes: Conjunctivae and EOM are normal.   Neck:   Normal range of motion.  Cardiovascular:  Normal rate.           Pulmonary/Chest: No respiratory distress.   Abdominal: Abdomen is soft. He exhibits no distension. There is no abdominal tenderness.   Positive left CVA tenderness There is no rebound.   Musculoskeletal:          General: Normal range of motion.      Cervical back: Normal range of motion.     Neurological: He is alert and oriented to person, place, and time.   Skin: Skin is warm and dry. No rash noted. No erythema.   Psychiatric: He has a normal mood and affect. Thought content normal.         ED Course   Procedures  Labs Reviewed   CBC W/ AUTO DIFFERENTIAL - Abnormal       Result Value    WBC 11.43      RBC 5.24      Hemoglobin 15.3      Hematocrit 46.6      MCV 89      MCH 29.2      MCHC 32.8      RDW 12.7      Platelets 219      MPV 11.0      Immature Granulocytes 0.3      Gran # (ANC) 8.9 (*)     Immature Grans (Abs) 0.03      Lymph # 1.8      Mono # 0.8      Eos # 0.0      Baso # 0.01      nRBC 0      Gran % 77.5 (*)     Lymph % 15.4 (*)     Mono % 6.6      Eosinophil % 0.1      Basophil % 0.1      Differential Method Automated     COMPREHENSIVE METABOLIC PANEL - Abnormal    Sodium 139      Potassium 4.2      Chloride 107      CO2 23      Glucose 96      BUN 15      Creatinine 2.0 (*)     Calcium 9.2      Total Protein 7.2      Albumin 4.1      Total Bilirubin 0.5      Alkaline Phosphatase 82      AST 25      ALT 32      eGFR 44.6 (*)     Anion Gap 9     URINALYSIS, REFLEX TO URINE CULTURE - Abnormal    Specimen UA Urine, Clean Catch      Color, UA Yellow      Appearance, UA Clear      pH, UA 8.0      Specific Gravity, UA 1.025      Protein, UA Trace (*)     Glucose, UA Negative      Ketones, UA Trace (*)     Bilirubin (UA) Negative      Occult Blood UA 1+ (*)     Nitrite, UA Negative      Leukocytes, UA Negative      Narrative:     Specimen Source->Urine   URINALYSIS MICROSCOPIC - Abnormal    RBC, UA 22 (*)     WBC, UA 1      Bacteria Rare      Squam Epithel, UA 0      Non-Squam Epith 0      Microscopic Comment SEE COMMENT      Narrative:     Specimen Source->Urine          Imaging Results              CT Abdomen Pelvis  Without Contrast (Final result)  Result time 12/17/24 19:25:12      Final result by  Rolo Khoury MD (12/17/24 19:25:12)                   Impression:      10 mm obstructing proximal LEFT ureteral stone.    Multiple nonobstructing intrarenal calculi bilaterally.    Small hiatal hernia.    Diverticulosis coli without diverticulitis.      Electronically signed by: Rolo Khoury  Date:    12/17/2024  Time:    19:25               Narrative:    EXAMINATION:  CT ABDOMEN PELVIS WITHOUT CONTRAST    CLINICAL HISTORY:  Flank pain, kidney stone suspected;    TECHNIQUE:  Low dose axial images, sagittal and coronal reformations were obtained from the lung bases to the pubic symphysis.  Oral contrast was not administered.    COMPARISON:  None    FINDINGS:  There is a 10 mm stone in the proximal 3rd of the left ureter.  Proximal left ureter and renal hydronephrosis.    Multiple intrarenal calculi are noted bilaterally with no other lower tract obstruction.  The urinary bladder appears unremarkable.    Diverticulosis in the descending and sigmoid colon without diverticulitis.  The remainder of the bowel appears normal.    The liver is fatty replaced.  The gallbladder, spleen, pancreas, adrenal glands and renal parenchyma appear unremarkable.  Accessory spleen is noted anteriorly to the left margin of the spleen.    The aorta and vena cava appear normal in caliber.  No retroperitoneal mass or fluid collection.  No inflammation.  No ascites or free air.  No pneumatosis.    Spondylosis is mild throughout the thoracic and lumbar spine.  Abdominal wall is intact.  Small hiatal hernia.  Lung bases clear.  Base of the heart pericardium unremarkable.                                       Medications   sodium chloride 0.9% flush 10 mL (has no administration in time range)   lactated ringers infusion ( Intravenous New Bag 12/17/24 2812)   oxyCODONE immediate release tablet 5 mg (has no administration in time range)   ondansetron injection 4 mg (has no administration in time range)   melatonin tablet 6 mg (has no  administration in time range)   diphenhydrAMINE capsule 25 mg (has no administration in time range)   tamsulosin 24 hr capsule 0.4 mg (0.4 mg Oral Given 12/17/24 2120)   acetaminophen tablet 650 mg (has no administration in time range)   sodium chloride 0.9% bolus 1,000 mL 1,000 mL (0 mLs Intravenous Stopped 12/17/24 1956)   ondansetron injection 4 mg (4 mg Intravenous Given 12/17/24 1849)   ketorolac injection 9.999 mg (9.999 mg Intravenous Given 12/17/24 2003)   morphine injection 4 mg (4 mg Intravenous Given 12/17/24 1903)     Medical Decision Making  Nontoxic-appearing 34-year-old male presents with left flank pain with multiple episodes of emesis, sudden onset in last 24 hours.  No leukocytosis UA with hematuria, doubt pyelonephritis.  CT is remarkable for obstructing 10 mm left-sided ureter stone.  Patient also found to have an FACUNDO with creatinine of 2.0, baseline 1.4.  No concerns of infected stone based on urinalysis.  Consulted Urology for evaluation.  Patient will be admitted to Urology for expectant management.    Risk  Prescription drug management.                                      Clinical Impression:  Final diagnoses:  [N20.1] Ureteral stone          ED Disposition Condition    Observation                 Mary Díaz, CLINT  12/17/24 3854

## 2024-12-18 NOTE — NURSING
Pt voided urine. Pt received home med. Pt and family received discharge instructions and verbalized understanding. All questions answered. PIV removed, catheter intact. NADN. Pt declined wheelchair and left unit on foot with family and all belongings.

## 2024-12-18 NOTE — SUBJECTIVE & OBJECTIVE
History reviewed. No pertinent past medical history.    History reviewed. No pertinent surgical history.    Review of patient's allergies indicates:  No Known Allergies    Family History       Problem Relation (Age of Onset)    Breast cancer Mother    Colon cancer Maternal Uncle    Heart attack Father    Ovarian cancer Maternal Grandmother            Tobacco Use    Smoking status: Never    Smokeless tobacco: Never   Substance and Sexual Activity    Alcohol use: Not Currently    Drug use: Never    Sexual activity: Yes     Partners: Male     Birth control/protection: Condom     Comment: monogamous       Review of Systems   Constitutional:  Negative for appetite change, chills and fever.   HENT:  Negative for congestion and sore throat.    Respiratory:  Negative for cough and wheezing.    Gastrointestinal:  Negative for abdominal pain, nausea and vomiting.   Genitourinary:  Positive for flank pain. Negative for dysuria and hematuria.   Neurological:  Negative for headaches.   Psychiatric/Behavioral:  Negative for agitation.        Objective:     Temp:  [97.9 °F (36.6 °C)-98.1 °F (36.7 °C)] 97.9 °F (36.6 °C)  Pulse:  [] 82  Resp:  [17-20] 17  SpO2:  [97 %-98 %] 98 %  BP: (126-140)/(77-85) 126/77  Weight: 117.9 kg (260 lb)  Body mass index is 35.26 kg/m².    Date 12/17/24 0700 - 12/18/24 0659   Shift 1823-8845 7643-4419 7375-6723 24 Hour Total   INTAKE   IV Piggyback  1000  1000   Shift Total(mL/kg)  1000(8.5)  1000(8.5)   OUTPUT   Shift Total(mL/kg)       Weight (kg)  117.9 117.9 117.9          Drains       None                    Physical Exam  Constitutional:       General: He is not in acute distress.  HENT:      Head: Normocephalic.   Cardiovascular:      Rate and Rhythm: Normal rate.   Pulmonary:      Effort: Pulmonary effort is normal.   Abdominal:      General: There is no distension.      Palpations: Abdomen is soft.      Tenderness: There is no abdominal tenderness. There is left CVA tenderness. There is  no right CVA tenderness.   Musculoskeletal:         General: Normal range of motion.   Skin:     General: Skin is warm and dry.   Neurological:      Mental Status: He is alert.      Coordination: Coordination normal.   Psychiatric:         Behavior: Behavior normal.          Significant Labs:    BMP:  Recent Labs   Lab 12/17/24  1642      K 4.2      CO2 23   BUN 15   CREATININE 2.0*   CALCIUM 9.2       CBC:  Recent Labs   Lab 12/17/24  1642   WBC 11.43   HGB 15.3   HCT 46.6          All pertinent labs results from the past 24 hours have been reviewed.    Significant Imaging:  All pertinent imaging results/findings from the past 24 hours have been reviewed.

## 2024-12-18 NOTE — OP NOTE
[facility] ([facility])  Urology Department  Operative Note    Date of Procedure: 12/18/2024     Pre-Operative Diagnosis:   Ureteral stone [N20.1]  Patient Active Problem List    Diagnosis Date Noted    Ureteral stone 12/17/2024    FACUNDO (acute kidney injury) 12/17/2024    Class 2 obesity with body mass index (BMI) of 38.0 to 38.9 in adult 11/15/2024       Post-Operative Diagnosis: same    Procedure(s) Performed:    1. Cystoscopy with left ureteral JJ stent placement  2. Fluoroscopy < 1 h   3. Retrograde pyelogram    Staff Surgeon: Tj Rodrigues MD    Assistant Surgeon: Kumar Bernard MD    Anesthesia: Monitored Local Anesthesia with Sedation    Estimated Blood Loss: min    Drains:  6 [place] x 28 cm left JJ ureteral stent without strings    Specimen(s): None    Indications: Per Salas is a 34 y.o. male with a left ureteral stone and FACUNDO. After the risks, benefits, and alternatives were discussed and all questions were answered to his satisfaction, he elected to proceed with surgery.    Operative Findings:  left ureteral stent placed in standard fashion.     Procedure in Detail:  The patient was transferred to the operating room. SCDs were applied and working. Time out was performed, magalis-procedural antibiotics were given. Anesthesia was administered. After adequate anesthesia the patient was placed in dorsal lithotomy position and prepped and draped in the usual sterile fashion.     A rigid cystoscope in a 22 Fr sheath was introduced into the bladder per urethra. The entire urethra was visualized and revealed no strictures or masses. Cystoscopy was performed which showed the right and left ureteral orifices in the normal anatomic position. There were no bladder tumors, no trabeculations, and no stones.    Attention was turned to the patient's left ureteral orifice, and a motion wire was used to cannulate the UO. The wire was exchanged for a 5 Fr ureteral catheter. Contrast was instilled which showed a proximal  filling defect consistent with stone. The wire was replaced into the renal pelvis and the ureteral catheter was removed.     A 6 Fr x 28 cm JJ ureteral stent without strings over the wire to the level of the renal pelvis under direct vision as well as flouroscopy. When the stent was in the appropriate position, the wire was removed. There were good coils visualized both proximally and distally using fluoroscopy and direct vision in the bladder. The bladder was then drained.    He tolerated the procedure well and was transferred to the recovery room in stable condition.      Disposition: He will be discharged home. Will follow up with Dr. Rodrigues to discuss definitive stone management.     Kumar Bernard MD

## 2024-12-18 NOTE — PROGRESS NOTES
[facility]  Urology  Progress Note    Patient Name: Per Salas  MRN: [id number]  Admission Date: 12/17/2024  Hospital Length of Stay: 0 days  Code Status: Full Code   Attending Provider: Tj Rodrigues MD   Primary Care Physician: Juan Carlos Garcia MD    Subjective:     HPI:  34-year-old male history of nephrolithiasis presenting to the emergency department due to sudden onset of left flank pain with multiple episodes emesis. Patient reports symptoms began this morning around 6:40 a.m..     On assessment, patient is afebrile, VSS. Patient complaining of left flank pain, nausea, vomiting. Patient denies fevers, chills, dysuria, hematuria. Reports previously p[passing kidney stones but denies prior stone surgeries.   WBC 11.4. UA non concerning for infection.   Cr 2.0 from baseline 1.4  CTRSS with 1 cm proximal left ureteral stone with hydronephrosis. Bilateral nephrolithiasis. No right ureteral stones.     Interval History: NAEO.  NPO for possible L ureteral stent today.  AM Cr pending.      Review of Systems: per HPI   Objective:     Temp:  [96.2 °F (35.7 °C)-98.6 °F (37 °C)] 98.6 °F (37 °C)  Pulse:  [] 89  Resp:  [17-20] 18  SpO2:  [97 %-99 %] 99 %  BP: (116-140)/(69-85) 126/69     Body mass index is 35.25 kg/m².           Drains       None                    Physical Exam  Constitutional:       General: He is not in acute distress.  HENT:      Head: Normocephalic.   Cardiovascular:      Rate and Rhythm: Normal rate.   Pulmonary:      Effort: Pulmonary effort is normal.   Abdominal:      General: There is no distension.      Palpations: Abdomen is soft.      Tenderness: There is no abdominal tenderness. There is left CVA tenderness. There is no right CVA tenderness.   Musculoskeletal:         General: Normal range of motion.   Skin:     General: Skin is warm and dry.   Neurological:      Mental Status: He is alert.      Coordination: Coordination normal.   Psychiatric:         Behavior: Behavior  normal.           Significant Labs:    BMP:  Recent Labs   Lab 12/17/24  1642      K 4.2      CO2 23   BUN 15   CREATININE 2.0*   CALCIUM 9.2       CBC:   Recent Labs   Lab 12/17/24  1642   WBC 11.43   HGB 15.3   HCT 46.6          All pertinent labs results from the past 24 hours have been reviewed.    Significant Imaging:  All pertinent imaging results/findings from the past 24 hours have been reviewed.                  Assessment/Plan:     * Ureteral stone  Per Salsa is a 34 y.o. M with left ureteral stone and FACUNDO    - f/u Cr this AM.  If does not anival, will plan for left ureteral stent today   - I discussed with the patient that they have an obstructing ureteral stone and an FACUNDO   - NPO  - will obtain consent   - IVF  - Pain control  - Nausea control  - Flomax  - Strain all urine          VTE Risk Mitigation (From admission, onward)           Ordered     IP VTE HIGH RISK PATIENT  Once         12/17/24 2051     Place sequential compression device  Until discontinued         12/17/24 2051                    Cassandra López MD  Urology  [facility] - Surgery

## 2024-12-18 NOTE — TRANSFER OF CARE
Anesthesia Transfer of Care Note    Patient: Per Salas    Procedure(s) Performed: Procedure(s) (LRB):  CYSTOSCOPY, WITH URETERAL STENT INSERTION (Left)  PYELOGRAM, RETROGRADE (Left)    Patient location: PACU    Anesthesia Type: MAC    Transport from OR: Transported from OR on 6-10 L/min O2 by face mask with adequate spontaneous ventilation    Post pain: adequate analgesia    Post assessment: no apparent anesthetic complications    Post vital signs: stable    Level of consciousness: awake and alert    Nausea/Vomiting: no nausea/vomiting    Complications: none    Transfer of care protocol was followed      Last vitals: Visit Vitals  /65 (BP Location: Right arm, Patient Position: Lying)   Pulse 89   Temp 36.5 °C (97.7 °F) (Temporal)   Resp 15   Ht 6' (1.829 m)   Wt 117.9 kg (259 lb 14.8 oz)   SpO2 100%   BMI 35.25 kg/m²

## 2024-12-18 NOTE — SUBJECTIVE & OBJECTIVE
Interval History: NAEO.  NPO for possible L ureteral stent today.  AM Cr pending.      Review of Systems: per HPI   Objective:     Temp:  [96.2 °F (35.7 °C)-98.6 °F (37 °C)] 98.6 °F (37 °C)  Pulse:  [] 89  Resp:  [17-20] 18  SpO2:  [97 %-99 %] 99 %  BP: (116-140)/(69-85) 126/69     Body mass index is 35.25 kg/m².           Drains       None                    Physical Exam  Constitutional:       General: He is not in acute distress.  HENT:      Head: Normocephalic.   Cardiovascular:      Rate and Rhythm: Normal rate.   Pulmonary:      Effort: Pulmonary effort is normal.   Abdominal:      General: There is no distension.      Palpations: Abdomen is soft.      Tenderness: There is no abdominal tenderness. There is left CVA tenderness. There is no right CVA tenderness.   Musculoskeletal:         General: Normal range of motion.   Skin:     General: Skin is warm and dry.   Neurological:      Mental Status: He is alert.      Coordination: Coordination normal.   Psychiatric:         Behavior: Behavior normal.           Significant Labs:    BMP:  Recent Labs   Lab 12/17/24  1642      K 4.2      CO2 23   BUN 15   CREATININE 2.0*   CALCIUM 9.2       CBC:   Recent Labs   Lab 12/17/24  1642   WBC 11.43   HGB 15.3   HCT 46.6          All pertinent labs results from the past 24 hours have been reviewed.    Significant Imaging:  All pertinent imaging results/findings from the past 24 hours have been reviewed.

## 2024-12-18 NOTE — ASSESSMENT & PLAN NOTE
Per Salas is a 34 y.o. M with left ureteral stone and FACUNDO    - Admit to observation under Urology service.  - I discussed with the patient that they have an obstructing ureteral stone and an FACUNDO   - Plan for cystoscopy with ureteral stent placement tomorrow   - NPO at midnight  - IVF  - Pain control  - Nausea control  - Flomax  - Strain all urine

## 2024-12-19 NOTE — PLAN OF CARE
[facility]  Discharge Final Note    Primary Care Provider: Juan Carlos Garcia MD    Expected Discharge Date: 12/18/2024    Final Discharge Note (most recent)       Final Note - 12/18/24 1636          Final Note    Assessment Type Final Discharge Note     Anticipated Discharge Disposition Home or Self Care     Hospital Resources/Appts/Education Provided Provided patient/caregiver with written discharge plan information;Provided education on problems/symptoms using teachback                   Future Appointments   Date Time Provider Department Center   1/6/2025  1:00 PM Tj Rodrigues MD [facility] UROLOG [facility]     Contact Info       Tj Rodrigues MD   Specialty: Urology    [address]  [address]th [address]  [facility] [address]   Phone: [phone]       Next Steps: Follow up in 1 week(s)

## 2024-12-23 NOTE — ANESTHESIA POSTPROCEDURE EVALUATION
Anesthesia Post Evaluation    Patient: Per Salas    Procedure(s) Performed: Procedure(s) (LRB):  CYSTOSCOPY, WITH URETERAL STENT INSERTION (Left)  PYELOGRAM, RETROGRADE (Left)    Final Anesthesia Type: general      Patient location during evaluation: PACU  Patient participation: Yes- Able to Participate  Level of consciousness: awake and alert and oriented  Pain management: adequate  Airway patency: patent    PONV status at discharge: No PONV  Anesthetic complications: no      Cardiovascular status: blood pressure returned to baseline and hemodynamically stable  Respiratory status: unassisted  Hydration status: euvolemic  Follow-up not needed.              Vitals Value Taken Time   /83 12/18/24 1554   Temp 36.6 °C (97.9 °F) 12/18/24 1554   Pulse 70 12/18/24 1554   Resp 18 12/18/24 1554   SpO2 98 % 12/18/24 1554         Event Time   Out of Recovery 12/18/2024 14:30:00         Pain/Brody Score: No data recorded

## 2024-12-26 ENCOUNTER — PATIENT OUTREACH (OUTPATIENT)
Dept: ADMINISTRATIVE | Facility: CLINIC | Age: 34
End: 2024-12-26
Payer: COMMERCIAL

## 2024-12-26 NOTE — PROGRESS NOTES
C3 nurse spoke with Per Salas for a TCC post hospital discharge follow up call. The patient does not have a scheduled HOSFU appointment with Juan Carlos Garcia MD  within 5-7 days post hospital discharge date 12/18/24. C3 nurse was unable to schedule HOSFU appointment in [facility].    Message sent to PCP staff requesting they contact patient and schedule follow up appointment.

## 2024-12-26 NOTE — PROGRESS NOTES
C3 nurse attempted to contact Per Salas for a TCC post hospital discharge follow up call. No answer. Left voicemail with callback information. The patient does not have a scheduled HOSFU appointment. Message sent to PCP staff for assistance with scheduling visit with patient.

## 2025-01-06 ENCOUNTER — OFFICE VISIT (OUTPATIENT)
Dept: UROLOGY | Facility: CLINIC | Age: 35
End: 2025-01-06
Payer: COMMERCIAL

## 2025-01-06 DIAGNOSIS — N20.1 URETERAL STONE: Primary | ICD-10-CM

## 2025-01-06 NOTE — H&P (VIEW-ONLY)
[facility]  Urologic Oncology    The patient location is: [place], [place]  The chief complaint leading to consultation is:  Urolithiasis    Visit type: audiovisual    Face to Face time with patient: 8  25 minutes of total time spent on the encounter, which includes face to face time and non-face to face time preparing to see the patient (eg, review of tests), Obtaining and/or reviewing separately obtained history, Documenting clinical information in the electronic or other health record, Independently interpreting results (not separately reported) and communicating results to the patient/family/caregiver, or Care coordination (not separately reported).     Each patient to whom he or she provides medical services by telemedicine is:  (1) informed of the relationship between the physician and patient and the respective role of any other health care provider with respect to management of the patient; and (2) notified that he or she may decline to receive medical services by telemedicine and may withdraw from such care at any time.    Date of Service: 01/06/2025    Urologic Oncology Problem List:  Urolithiasis status post left ureteral stent placement and retrograde pyelogram on 12/18/2024    History of Present Illness:   Patient is a very pleasant 34-year-old male who is status post left ureteral stent placement and retrograde pyelogram for a obstructing left ureteral stone and FACUNDO.  He underwent stent decompression on 12/18/2024 and presents today for follow up.  He is fortunately tolerating the stent very well, his hematuria has resolved and he has minimal cramping and discomfort on that left side.    He presents today for discussion of cystoscopy retrograde, ureteroscopy, laser lithotripsy    Imaging: I have reviewed the imaging study CT abdomen and pelvis without contrast on 12/17/2024 personally, and agree with the findings    Allergies:  Review of patient's allergies indicates:  No Known Allergies      Medications per EMR:  (Not in a hospital admission)      Past Medical History:  No past medical history on file.     Past Surgical History:  Past Surgical History:   Procedure Laterality Date    CYSTOSCOPY W/ URETERAL STENT PLACEMENT Left 12/18/2024    Procedure: CYSTOSCOPY, WITH URETERAL STENT INSERTION;  Surgeon: Tj Rodrigues MD;  Location: [address];  Service: Urology;  Laterality: Left;    RETROGRADE PYELOGRAPHY Left 12/18/2024    Procedure: PYELOGRAM, RETROGRADE;  Surgeon: Tj Rodrigues MD;  Location: [address];  Service: Urology;  Laterality: Left;        Family History:  Family History   Problem Relation Name Age of Onset    Breast cancer Mother      Heart attack Father      Ovarian cancer Maternal Grandmother      Colon cancer Maternal Uncle          Social History:  Social History     Tobacco Use    Smoking status: Never    Smokeless tobacco: Never   Substance Use Topics    Alcohol use: Not Currently          OBJECTIVE:     There were no vitals filed for this visit.     Physical Exam    General: No acute distress. Nontoxic appearing.  HENT: Normocephalic. Atraumatic.  Respiratory: Normal respiratory effort. No conversational dyspnea. No audible wheezing.  Abdomen: No obvious distension.  Skin: No visible abnormalities.  Extremities: No edema upper extremities. No edema lower extremities.  Neurological: Alert and oriented x3. Normal speech.  Psychiatric: Normal mood. Normal affect. No evidence of SI.         LABS:    CBC:  Lab Results   Component Value Date    WBC 8.26 12/18/2024    HGB 14.2 12/18/2024    HCT 45.1 12/18/2024    MCV 90 12/18/2024     12/18/2024         BMP:  Lab Results   Component Value Date     12/18/2024    K 4.0 12/18/2024     12/18/2024    CO2 25 12/18/2024    BUN 15 12/18/2024    CREATININE 1.9 (H) 12/18/2024    CALCIUM 8.7 12/18/2024    ANIONGAP 6 (L) 12/18/2024    EGFRNORACEVR 47.5 (A) 12/18/2024         ASSESSMENT/PLAN:     Assessment:  34-year-old male status post ureteral stent placement for urolithiasis     Plan:   We had a thorough discussion regarding the plan moving forward regarding their urolithiasis.  We discussed definitive stone management in the form of laser lithotripsy, stone basketing, and stent exchange.  We discussed the procedure in detail and I walked them through the steps of the surgery.  Specifically, we discussed the use of a retrograde pyelogram, clearing the ureter with the ureteral scope, firing of the endoscopic laser, and exchange or placement of a stent.  We discussed placement of a ureteral stent after surgery, and that sometimes this can be placed with or without a string.  If a stent is placed without a string, it will need to be removed cystoscopically in clinic, whereas a stent placed with a string can be removed at home by the patient or in clinic with our nurses.    We then discussed risks, benefits, alternatives associated with this surgery, including complications, detailed below:  -General anesthesia:  Associated risks include pneumonia, cerebrovascular accident, cardiac events including myocardial infarction, pulmonary embolism, deep vein thrombosis  -Ureteroscopy and Laser Lithotripsy:  Associated risks include urinary tract infection and sepsis, ureteral perforation, incomplete stone fragmentation and retrieval, risk of Steinstrasse, ureteral stricture formation, hematuria, and continued flank pain.     I spent a total of 25 minutes on the day of the visit.This includes face to face time and non-face to face time preparing to see the patient (eg, review of tests), obtaining and/or reviewing separately obtained history, documenting clinical information in the electronic or other health record, independently interpreting results and communicating results to the patient/family/caregiver, or care coordinator  This encounter was dictated and transcribed using DeepScribe and FluencyDirect, please excuse any  typographical or grammatical errors.    - code applied: patient requires or will require a continuous, longitudinal, and active collaborative plan of care related to this patient's health condition, urolithiasis --the management of which requires the direction of a practitioner with specialized clinical knowledge, skill, and expertise.

## 2025-01-06 NOTE — PROGRESS NOTES
[facility]  Urologic Oncology    The patient location is: [place], [place]  The chief complaint leading to consultation is:  Urolithiasis    Visit type: audiovisual    Face to Face time with patient: 8  25 minutes of total time spent on the encounter, which includes face to face time and non-face to face time preparing to see the patient (eg, review of tests), Obtaining and/or reviewing separately obtained history, Documenting clinical information in the electronic or other health record, Independently interpreting results (not separately reported) and communicating results to the patient/family/caregiver, or Care coordination (not separately reported).     Each patient to whom he or she provides medical services by telemedicine is:  (1) informed of the relationship between the physician and patient and the respective role of any other health care provider with respect to management of the patient; and (2) notified that he or she may decline to receive medical services by telemedicine and may withdraw from such care at any time.    Date of Service: 01/06/2025    Urologic Oncology Problem List:  Urolithiasis status post left ureteral stent placement and retrograde pyelogram on 12/18/2024    History of Present Illness:   Patient is a very pleasant 34-year-old male who is status post left ureteral stent placement and retrograde pyelogram for a obstructing left ureteral stone and FACUNDO.  He underwent stent decompression on 12/18/2024 and presents today for follow up.  He is fortunately tolerating the stent very well, his hematuria has resolved and he has minimal cramping and discomfort on that left side.    He presents today for discussion of cystoscopy retrograde, ureteroscopy, laser lithotripsy    Imaging: I have reviewed the imaging study CT abdomen and pelvis without contrast on 12/17/2024 personally, and agree with the findings    Allergies:  Review of patient's allergies indicates:  No Known Allergies      Medications per EMR:  (Not in a hospital admission)      Past Medical History:  No past medical history on file.     Past Surgical History:  Past Surgical History:   Procedure Laterality Date    CYSTOSCOPY W/ URETERAL STENT PLACEMENT Left 12/18/2024    Procedure: CYSTOSCOPY, WITH URETERAL STENT INSERTION;  Surgeon: Tj Rodrigues MD;  Location: [address];  Service: Urology;  Laterality: Left;    RETROGRADE PYELOGRAPHY Left 12/18/2024    Procedure: PYELOGRAM, RETROGRADE;  Surgeon: Tj Rodrigues MD;  Location: [address];  Service: Urology;  Laterality: Left;        Family History:  Family History   Problem Relation Name Age of Onset    Breast cancer Mother      Heart attack Father      Ovarian cancer Maternal Grandmother      Colon cancer Maternal Uncle          Social History:  Social History     Tobacco Use    Smoking status: Never    Smokeless tobacco: Never   Substance Use Topics    Alcohol use: Not Currently          OBJECTIVE:     There were no vitals filed for this visit.     Physical Exam    General: No acute distress. Nontoxic appearing.  HENT: Normocephalic. Atraumatic.  Respiratory: Normal respiratory effort. No conversational dyspnea. No audible wheezing.  Abdomen: No obvious distension.  Skin: No visible abnormalities.  Extremities: No edema upper extremities. No edema lower extremities.  Neurological: Alert and oriented x3. Normal speech.  Psychiatric: Normal mood. Normal affect. No evidence of SI.         LABS:    CBC:  Lab Results   Component Value Date    WBC 8.26 12/18/2024    HGB 14.2 12/18/2024    HCT 45.1 12/18/2024    MCV 90 12/18/2024     12/18/2024         BMP:  Lab Results   Component Value Date     12/18/2024    K 4.0 12/18/2024     12/18/2024    CO2 25 12/18/2024    BUN 15 12/18/2024    CREATININE 1.9 (H) 12/18/2024    CALCIUM 8.7 12/18/2024    ANIONGAP 6 (L) 12/18/2024    EGFRNORACEVR 47.5 (A) 12/18/2024         ASSESSMENT/PLAN:     Assessment:  34-year-old male status post ureteral stent placement for urolithiasis     Plan:   We had a thorough discussion regarding the plan moving forward regarding their urolithiasis.  We discussed definitive stone management in the form of laser lithotripsy, stone basketing, and stent exchange.  We discussed the procedure in detail and I walked them through the steps of the surgery.  Specifically, we discussed the use of a retrograde pyelogram, clearing the ureter with the ureteral scope, firing of the endoscopic laser, and exchange or placement of a stent.  We discussed placement of a ureteral stent after surgery, and that sometimes this can be placed with or without a string.  If a stent is placed without a string, it will need to be removed cystoscopically in clinic, whereas a stent placed with a string can be removed at home by the patient or in clinic with our nurses.    We then discussed risks, benefits, alternatives associated with this surgery, including complications, detailed below:  -General anesthesia:  Associated risks include pneumonia, cerebrovascular accident, cardiac events including myocardial infarction, pulmonary embolism, deep vein thrombosis  -Ureteroscopy and Laser Lithotripsy:  Associated risks include urinary tract infection and sepsis, ureteral perforation, incomplete stone fragmentation and retrieval, risk of Steinstrasse, ureteral stricture formation, hematuria, and continued flank pain.     I spent a total of 25 minutes on the day of the visit.This includes face to face time and non-face to face time preparing to see the patient (eg, review of tests), obtaining and/or reviewing separately obtained history, documenting clinical information in the electronic or other health record, independently interpreting results and communicating results to the patient/family/caregiver, or care coordinator  This encounter was dictated and transcribed using DeepScribe and FluencyDirect, please excuse any  typographical or grammatical errors.    - code applied: patient requires or will require a continuous, longitudinal, and active collaborative plan of care related to this patient's health condition, urolithiasis --the management of which requires the direction of a practitioner with specialized clinical knowledge, skill, and expertise.

## 2025-01-08 NOTE — ANESTHESIA PAT ROS NOTE
01/08/2025  Per Salas is a 34 y.o., male.      Pre-op Assessment          Review of Systems  Anesthesia Hx:  No problems with previous Anesthesia   History of prior surgery of interest to airway management or planning:  Previous anesthesia: General 12/18/2024  Cystoscopy with Urteral Sent Insertion, Pyelogram Retrograde with general anesthesia.        Denies Family Hx of Anesthesia complications.    Denies Personal Hx of Anesthesia complications.                    Social:  Non-Smoker, No Alcohol Use       Hematology/Oncology:  Hematology Normal   Oncology Normal                                   EENT/Dental:  EENT/Dental Normal           Cardiovascular:        Denies MI.        Denies Angina.            Functional Capacity good / => 4 METS                         Pulmonary:       Denies Shortness of breath.   Denies Sleep Apnea.                Renal/:   Renal Symptoms/Infections/Stones:              Hepatic/GI:  Hepatic/GI Normal                    Musculoskeletal:  Musculoskeletal Normal                Neurological:    Denies CVA.    Denies Seizures.                                Endocrine:        Obesity / BMI > 30  Dermatological:  Skin Normal    Psych:  Psychiatric Normal                         Anesthesia Assessment: Preoperative EQUATION    Planned Procedure: Procedure(s) (LRB):  CYSTOSCOPY, WITH RETROGRADE PYELOGRAM (Right)  Requested Anesthesia Type:General/MAC  Surgeon: Tj Rodrigues MD  Service: Urology  Known or anticipated Date of Surgery:1/31/2025    Surgeon notes: reviewed    Electronic QUestionnaire Assessment completed via nurse interview with patient.        Triage considerations:     The patient has no apparent active cardiac condition (No unstable coronary Syndrome such as severe unstable angina or recent [<1 month] myocardial infarction, decompensated CHF, severe valvular    disease or significant arrhythmia)    Previous anesthesia records:GETA and No problems,ASA2  12/18/2024 Cystoscopy with ureteral stent insertion (L) Pyelogram Retrograde  Airway:  Mallampati: I   Mouth Opening: Normal  TM Distance: Normal  Tongue: Normal  Neck ROM: Normal ROM       Last PCP note: within 3 months , within [facility]   Subspecialty notes: Urology    Other important co-morbidities: Obesity      Tests already available:  Available tests,  within 1 month , within [facility] .  12/18/2024 CBC, CMP              Instructions given. (See in Nurse's note)    Optimization:  Anesthesia Preop Clinic Assessment  Indicated not required    Medical Opinion Indicated       Sub-specialist consult indicated:   TBD       Plan:    Testing:  None Needed        Consultation: Phone Screen      Navigation:                  Straight Line to surgery.               No tests, anesthesia preop clinic visit, or consult required.

## 2025-01-17 ENCOUNTER — LAB VISIT (OUTPATIENT)
Dept: LAB | Facility: HOSPITAL | Age: 35
End: 2025-01-17
Attending: UROLOGY
Payer: COMMERCIAL

## 2025-01-17 DIAGNOSIS — N20.1 URETERAL STONE: ICD-10-CM

## 2025-01-17 LAB
BILIRUB UR QL STRIP: NEGATIVE
CLARITY UR REFRACT.AUTO: CLEAR
COLOR UR AUTO: COLORLESS
GLUCOSE UR QL STRIP: NEGATIVE
HGB UR QL STRIP: ABNORMAL
KETONES UR QL STRIP: NEGATIVE
LEUKOCYTE ESTERASE UR QL STRIP: NEGATIVE
MICROSCOPIC COMMENT: NORMAL
NITRITE UR QL STRIP: NEGATIVE
PH UR STRIP: 6 [PH] (ref 5–8)
PROT UR QL STRIP: NEGATIVE
RBC #/AREA URNS AUTO: 1 /HPF (ref 0–4)
SP GR UR STRIP: 1 (ref 1–1.03)
URN SPEC COLLECT METH UR: ABNORMAL
WBC #/AREA URNS AUTO: 1 /HPF (ref 0–5)

## 2025-01-17 PROCEDURE — 81001 URINALYSIS AUTO W/SCOPE: CPT | Performed by: UROLOGY

## 2025-01-17 PROCEDURE — 87086 URINE CULTURE/COLONY COUNT: CPT | Performed by: UROLOGY

## 2025-01-18 LAB — BACTERIA UR CULT: NO GROWTH

## 2025-01-20 ENCOUNTER — PATIENT MESSAGE (OUTPATIENT)
Dept: UROLOGY | Facility: CLINIC | Age: 35
End: 2025-01-20
Payer: COMMERCIAL

## 2025-01-22 RX ORDER — TAMSULOSIN HYDROCHLORIDE 0.4 MG/1
0.4 CAPSULE ORAL DAILY
Qty: 30 CAPSULE | Refills: 0 | Status: ON HOLD | OUTPATIENT
Start: 2025-01-22 | End: 2025-01-31

## 2025-01-30 ENCOUNTER — PATIENT MESSAGE (OUTPATIENT)
Dept: UROLOGY | Facility: CLINIC | Age: 35
End: 2025-01-30
Payer: COMMERCIAL

## 2025-01-30 ENCOUNTER — TELEPHONE (OUTPATIENT)
Dept: UROLOGY | Facility: CLINIC | Age: 35
End: 2025-01-30
Payer: COMMERCIAL

## 2025-01-31 ENCOUNTER — ANESTHESIA (OUTPATIENT)
Dept: SURGERY | Facility: HOSPITAL | Age: 35
End: 2025-01-31
Payer: COMMERCIAL

## 2025-01-31 ENCOUNTER — HOSPITAL ENCOUNTER (OUTPATIENT)
Facility: HOSPITAL | Age: 35
Discharge: HOME | End: 2025-01-31
Attending: UROLOGY | Admitting: UROLOGY
Payer: COMMERCIAL

## 2025-01-31 ENCOUNTER — ANESTHESIA EVENT (OUTPATIENT)
Dept: SURGERY | Facility: HOSPITAL | Age: 35
End: 2025-01-31
Payer: COMMERCIAL

## 2025-01-31 VITALS
DIASTOLIC BLOOD PRESSURE: 70 MMHG | HEIGHT: 72 IN | SYSTOLIC BLOOD PRESSURE: 123 MMHG | HEART RATE: 82 BPM | TEMPERATURE: 98 F | WEIGHT: 259 LBS | RESPIRATION RATE: 18 BRPM | OXYGEN SATURATION: 95 % | BODY MASS INDEX: 35.08 KG/M2

## 2025-01-31 DIAGNOSIS — N20.0 NEPHROLITHIASIS: Primary | ICD-10-CM

## 2025-01-31 PROCEDURE — 63600175 PHARM REV CODE 636 W HCPCS

## 2025-01-31 PROCEDURE — 37000009 HC ANESTHESIA EA ADD 15 MINS: Performed by: UROLOGY

## 2025-01-31 PROCEDURE — 36000708 HC OR TIME LEV III 1ST 15 MIN: Performed by: UROLOGY

## 2025-01-31 PROCEDURE — 71000015 HC POSTOP RECOV 1ST HR: Performed by: UROLOGY

## 2025-01-31 PROCEDURE — 52356 CYSTO/URETERO W/LITHOTRIPSY: CPT | Mod: LT,,, | Performed by: UROLOGY

## 2025-01-31 PROCEDURE — 63600175 PHARM REV CODE 636 W HCPCS: Performed by: NURSE ANESTHETIST, CERTIFIED REGISTERED

## 2025-01-31 PROCEDURE — 27201423 OPTIME MED/SURG SUP & DEVICES STERILE SUPPLY: Performed by: UROLOGY

## 2025-01-31 PROCEDURE — 36000709 HC OR TIME LEV III EA ADD 15 MIN: Performed by: UROLOGY

## 2025-01-31 PROCEDURE — C1758 CATHETER, URETERAL: HCPCS | Performed by: UROLOGY

## 2025-01-31 PROCEDURE — 25500020 PHARM REV CODE 255: Performed by: UROLOGY

## 2025-01-31 PROCEDURE — 71000044 HC DOSC ROUTINE RECOVERY FIRST HOUR: Performed by: UROLOGY

## 2025-01-31 PROCEDURE — C1894 INTRO/SHEATH, NON-LASER: HCPCS | Performed by: UROLOGY

## 2025-01-31 PROCEDURE — 82365 CALCULUS SPECTROSCOPY: CPT | Performed by: UROLOGY

## 2025-01-31 PROCEDURE — 37000008 HC ANESTHESIA 1ST 15 MINUTES: Performed by: UROLOGY

## 2025-01-31 PROCEDURE — C1769 GUIDE WIRE: HCPCS | Performed by: UROLOGY

## 2025-01-31 PROCEDURE — D9220A PRA ANESTHESIA: Mod: CRNA,,, | Performed by: NURSE ANESTHETIST, CERTIFIED REGISTERED

## 2025-01-31 PROCEDURE — D9220A PRA ANESTHESIA: Mod: ANES,,, | Performed by: ANESTHESIOLOGY

## 2025-01-31 PROCEDURE — 25000003 PHARM REV CODE 250: Performed by: NURSE ANESTHETIST, CERTIFIED REGISTERED

## 2025-01-31 PROCEDURE — 74420 UROGRAPHY RTRGR +-KUB: CPT | Mod: 26,,, | Performed by: UROLOGY

## 2025-01-31 PROCEDURE — C2617 STENT, NON-COR, TEM W/O DEL: HCPCS | Performed by: UROLOGY

## 2025-01-31 DEVICE — STENT URETERAL UNIV 6FR 28CM
Type: IMPLANTABLE DEVICE | Site: URETER | Status: FUNCTIONAL
Removed: 2025-02-24

## 2025-01-31 RX ORDER — ONDANSETRON HYDROCHLORIDE 2 MG/ML
INJECTION, SOLUTION INTRAVENOUS
Status: DISCONTINUED | OUTPATIENT
Start: 2025-01-31 | End: 2025-01-31

## 2025-01-31 RX ORDER — PHENAZOPYRIDINE HYDROCHLORIDE 200 MG/1
200 TABLET, FILM COATED ORAL 3 TIMES DAILY PRN
Qty: 63 TABLET | Refills: 0 | Status: SHIPPED | OUTPATIENT
Start: 2025-01-31 | End: 2025-02-21

## 2025-01-31 RX ORDER — PROPOFOL 10 MG/ML
VIAL (ML) INTRAVENOUS
Status: DISCONTINUED | OUTPATIENT
Start: 2025-01-31 | End: 2025-01-31

## 2025-01-31 RX ORDER — LIDOCAINE HYDROCHLORIDE 20 MG/ML
INJECTION INTRAVENOUS
Status: DISCONTINUED | OUTPATIENT
Start: 2025-01-31 | End: 2025-01-31

## 2025-01-31 RX ORDER — PHENYLEPHRINE HYDROCHLORIDE 10 MG/ML
INJECTION INTRAVENOUS
Status: DISCONTINUED | OUTPATIENT
Start: 2025-01-31 | End: 2025-01-31

## 2025-01-31 RX ORDER — DEXAMETHASONE SODIUM PHOSPHATE 4 MG/ML
INJECTION, SOLUTION INTRA-ARTICULAR; INTRALESIONAL; INTRAMUSCULAR; INTRAVENOUS; SOFT TISSUE
Status: DISCONTINUED | OUTPATIENT
Start: 2025-01-31 | End: 2025-01-31

## 2025-01-31 RX ORDER — OXYCODONE HYDROCHLORIDE 5 MG/1
5 TABLET ORAL EVERY 4 HOURS PRN
Qty: 4 TABLET | Refills: 0 | Status: SHIPPED | OUTPATIENT
Start: 2025-01-31

## 2025-01-31 RX ORDER — FENTANYL CITRATE 50 UG/ML
INJECTION, SOLUTION INTRAMUSCULAR; INTRAVENOUS
Status: DISCONTINUED | OUTPATIENT
Start: 2025-01-31 | End: 2025-01-31

## 2025-01-31 RX ORDER — OXYBUTYNIN CHLORIDE 5 MG/1
5 TABLET ORAL 3 TIMES DAILY PRN
Qty: 63 TABLET | Refills: 0 | Status: SHIPPED | OUTPATIENT
Start: 2025-01-31 | End: 2025-02-21

## 2025-01-31 RX ORDER — CEFAZOLIN 2 G/1
2 INJECTION, POWDER, FOR SOLUTION INTRAMUSCULAR; INTRAVENOUS
Status: COMPLETED | OUTPATIENT
Start: 2025-01-31 | End: 2025-01-31

## 2025-01-31 RX ORDER — CEFAZOLIN SODIUM 1 G/3ML
INJECTION, POWDER, FOR SOLUTION INTRAMUSCULAR; INTRAVENOUS
Status: DISCONTINUED | OUTPATIENT
Start: 2025-01-31 | End: 2025-01-31

## 2025-01-31 RX ORDER — MIDAZOLAM HYDROCHLORIDE 1 MG/ML
INJECTION INTRAMUSCULAR; INTRAVENOUS
Status: DISCONTINUED | OUTPATIENT
Start: 2025-01-31 | End: 2025-01-31

## 2025-01-31 RX ORDER — TAMSULOSIN HYDROCHLORIDE 0.4 MG/1
0.4 CAPSULE ORAL DAILY
Qty: 30 CAPSULE | Refills: 0 | Status: SHIPPED | OUTPATIENT
Start: 2025-01-31 | End: 2025-03-02

## 2025-01-31 RX ORDER — ROCURONIUM BROMIDE 10 MG/ML
INJECTION, SOLUTION INTRAVENOUS
Status: DISCONTINUED | OUTPATIENT
Start: 2025-01-31 | End: 2025-01-31

## 2025-01-31 RX ORDER — DEXMEDETOMIDINE HYDROCHLORIDE 100 UG/ML
INJECTION, SOLUTION INTRAVENOUS
Status: DISCONTINUED | OUTPATIENT
Start: 2025-01-31 | End: 2025-01-31

## 2025-01-31 RX ADMIN — PHENYLEPHRINE HYDROCHLORIDE 200 MCG: 10 INJECTION INTRAVENOUS at 08:01

## 2025-01-31 RX ADMIN — SODIUM CHLORIDE: 0.9 INJECTION, SOLUTION INTRAVENOUS at 08:01

## 2025-01-31 RX ADMIN — PHENYLEPHRINE HYDROCHLORIDE 50 MCG: 10 INJECTION INTRAVENOUS at 08:01

## 2025-01-31 RX ADMIN — CEFAZOLIN 2 G: 2 INJECTION, POWDER, FOR SOLUTION INTRAMUSCULAR; INTRAVENOUS at 08:01

## 2025-01-31 RX ADMIN — FENTANYL CITRATE 100 MCG: 50 INJECTION, SOLUTION INTRAMUSCULAR; INTRAVENOUS at 08:01

## 2025-01-31 RX ADMIN — ONDANSETRON 4 MG: 2 INJECTION INTRAMUSCULAR; INTRAVENOUS at 08:01

## 2025-01-31 RX ADMIN — DEXMEDETOMIDINE 4 MCG: 100 INJECTION, SOLUTION, CONCENTRATE INTRAVENOUS at 08:01

## 2025-01-31 RX ADMIN — DEXMEDETOMIDINE 4 MCG: 100 INJECTION, SOLUTION, CONCENTRATE INTRAVENOUS at 09:01

## 2025-01-31 RX ADMIN — ROCURONIUM BROMIDE 50 MG: 10 INJECTION, SOLUTION INTRAVENOUS at 08:01

## 2025-01-31 RX ADMIN — SUGAMMADEX 200 MG: 100 INJECTION, SOLUTION INTRAVENOUS at 09:01

## 2025-01-31 RX ADMIN — PHENYLEPHRINE HYDROCHLORIDE 150 MCG: 10 INJECTION INTRAVENOUS at 08:01

## 2025-01-31 RX ADMIN — LIDOCAINE HYDROCHLORIDE 75 MG: 20 INJECTION INTRAVENOUS at 08:01

## 2025-01-31 RX ADMIN — DEXAMETHASONE SODIUM PHOSPHATE 8 MG: 4 INJECTION, SOLUTION INTRAMUSCULAR; INTRAVENOUS at 08:01

## 2025-01-31 RX ADMIN — DEXMEDETOMIDINE 12 MCG: 100 INJECTION, SOLUTION, CONCENTRATE INTRAVENOUS at 08:01

## 2025-01-31 RX ADMIN — SODIUM CHLORIDE: 0.9 INJECTION, SOLUTION INTRAVENOUS at 07:01

## 2025-01-31 RX ADMIN — MIDAZOLAM HYDROCHLORIDE 2 MG: 2 INJECTION, SOLUTION INTRAMUSCULAR; INTRAVENOUS at 07:01

## 2025-01-31 RX ADMIN — PROPOFOL 300 MG: 10 INJECTION, EMULSION INTRAVENOUS at 08:01

## 2025-01-31 NOTE — DISCHARGE INSTRUCTIONS
Postoperative Instructions for Stone Surgery    1. Ureteral Stent:     - You have a ureteral stent in place. You will be contacted for a separate office appointment to have this stent removed. It is typically removed in a short office-based procedure by placing a small camera into the bladder and grasping the stent to remove it. This procedure typically lasts 1-2 minutes.    2. Hydration and Fluid Intake:     - It is essential to drink plenty of fluids following the ureteroscopy procedure to promote flushing of the urinary system and prevent dehydration.     - Aim to drink at least 8 to 10 glasses of water or other non-caffeinated, non-alcoholic beverages daily, unless instructed otherwise by your healthcare provider.    3. Pain Management:     - You may experience mild to moderate discomfort or pain after the ureteroscopy procedure. This is usually manageable with over-the-counter pain relievers such as acetaminophen or nonsteroidal anti-inflammatory drugs (NSAIDs).     - You may experience mild bladder and flank discomfort especially when voiding due to your ureteral stent. This may be alleviated with medications that were prescribed to you such as oxybutynin and/or pyridium. You may take these as needed for any urinary discomfort while a stent. Please note this discomfort is temporary and should subside once the stent is removed.     - If prescribed pain medication, take it as instructed, following the prescribed dosage and frequency.    4. Urinary Symptoms:     - It is common to experience some urinary symptoms after the procedure, such as urgency, frequency, burning sensation, or blood in the urine (hematuria). These symptoms should gradually improve within a few days.     - If the symptoms worsen or if you notice significant blood clots or inability to urinate, contact your healthcare provider.    5. Activity and Rest:     - It is advisable to take it easy and get plenty of rest for the first few days  following the procedure. Avoid strenuous activities, heavy lifting, or vigorous exercise for 1-2 days.     - Gradually resume normal activities as you feel comfortable, but listen to your body and avoid overexertion.    6. Diet and Nutrition:     - Follow any dietary recommendations provided by your healthcare provider. In general, maintain a balanced diet with adequate fiber intake to prevent constipation.     - Avoid excessive consumption of salt, spicy foods, and caffeinated or carbonated beverages, as these may irritate the urinary system.    7. Follow-up Appointments:     - Attend all scheduled follow-up appointments with your healthcare provider to monitor your recovery and assess the success of the stone removal.     - You will be contacted via phone or the patient portal about any follow up appointments and tests/imaging in about 1-2 weeks.     - Additional imaging or laboratory tests may be ordered to evaluate the effectiveness of the procedure.    8. Signs of Complications:     - Be aware of potential complications and contact your healthcare provider if you experience any of the following: persistent or worsening pain, fever, chills, excessive fatigue, severe bleeding, inability to pass urine, or signs of infection.    9. Medication Compliance:     - If prescribed any medications, such as antibiotics or medications to prevent stone recurrence, take them as instructed by your healthcare provider. Follow the recommended dosage and complete the full course of medication.    If you have any additional questions, call [phone] and ask for your provider. If after hours (night or weekends) ask for urology on call and you will be directed to the appropriate provider.

## 2025-01-31 NOTE — ANESTHESIA PREPROCEDURE EVALUATION
01/31/2025  Per Salas is a 34 y.o., male.      Pre-op Assessment    I have reviewed the Patient Summary Reports.       I have reviewed the Medications.     Review of Systems  Anesthesia Hx:  No problems with previous Anesthesia   History of prior surgery of interest to airway management or planning:  Previous anesthesia: General 12/18/2024  Cystoscopy with Urteral Sent Insertion, Pyelogram Retrograde with general anesthesia.        Denies Family Hx of Anesthesia complications.    Denies Personal Hx of Anesthesia complications.                    Social:  No Alcohol Use       Hematology/Oncology:  Hematology Normal   Oncology Normal                                   EENT/Dental:  EENT/Dental Normal           Cardiovascular:  Exercise tolerance: good      Denies MI.        Denies Angina.            Functional Capacity good / => 4 METS                         Pulmonary:       Denies Shortness of breath.   Denies Sleep Apnea.                Renal/:  Chronic Renal Disease renal calculi   Renal Symptoms/Infections/Stones:      Kidney Function/Disease             Hepatic/GI:  Hepatic/GI Normal                    Musculoskeletal:  Musculoskeletal Normal                Neurological:    Denies CVA.    Denies Seizures.                                Endocrine:        Obesity / BMI > 30  Dermatological:  Skin Normal    Psych:  Psychiatric Normal                  Physical Exam  General: Alert, Cooperative and Oriented    Airway:  Mallampati: II   Mouth Opening: Normal  TM Distance: Normal  Tongue: Normal  Neck ROM: Normal ROM    Dental:  Intact        Anesthesia Assessment: Preoperative EQUATION    Planned Procedure: Procedure(s) (LRB):  PYELOGRAM, RETROGRADE (Left)  URETEROSCOPY (Left)  URETEROSCOPY, WITH LASER LITHOTRIPSY (Left)  REMOVAL, CALCULUS, URETER (Left)  CYSTOSCOPY, WITH URETERAL STENT INSERTION  (Left)  REMOVAL-STENT (Left)  NEPHROSCOPY (Left)  Requested Anesthesia Type:General/MAC  Surgeon: Tj Rodrigues MD  Service: Urology  Known or anticipated Date of Surgery:1/31/2025    Surgeon notes: reviewed    Electronic QUestionnaire Assessment completed via nurse interview with patient.        Triage considerations:     The patient has no apparent active cardiac condition (No unstable coronary Syndrome such as severe unstable angina or recent [<1 month] myocardial infarction, decompensated CHF, severe valvular   disease or significant arrhythmia)    Previous anesthesia records:GETA and No problems,ASA2  12/18/2024 Cystoscopy with ureteral stent insertion (L) Pyelogram Retrograde  Airway:  Mallampati: I   Mouth Opening: Normal  TM Distance: Normal  Tongue: Normal  Neck ROM: Normal ROM       Last PCP note: within 3 months , within [facility]   Subspecialty notes: Urology    Other important co-morbidities: Obesity      Tests already available:  Available tests,  within 1 month , within [facility] .  12/18/2024 CBC, CMP              Instructions given. (See in Nurse's note)    Optimization:  Anesthesia Preop Clinic Assessment  Indicated not required    Medical Opinion Indicated       Sub-specialist consult indicated:   TBD       Plan:    Testing:  None Needed        Consultation: Phone Screen      Navigation:                  Straight Line to surgery.               No tests, anesthesia preop clinic visit, or consult required.    Anesthesia Plan  Type of Anesthesia, risks & benefits discussed:    Anesthesia Type: Gen ETT  Intra-op Monitoring Plan: Standard ASA Monitors  Post Op Pain Control Plan: multimodal analgesia and IV/PO Opioids PRN  Induction:  IV  Airway Plan: Video, Post-Induction  Informed Consent: Informed consent signed with the Patient and all parties understand the risks and agree with anesthesia plan.  All questions answered.   ASA Score: 2    Ready For Surgery From Anesthesia Perspective.   .   Anesthesia  Evaluation      Airway   Mallampati: II  TM distance: Normal  Neck ROM: Normal ROM  Dental    (+) Intact    Pulmonary    (-) shortness of breath, sleep apnea  Cardiovascular   Exercise tolerance: good  (-) past MI, angina    Neuro/Psych    (-) seizures, CVA    GI/Hepatic/Renal    (+) chronic renal disease    Endo/Other    Abdominal

## 2025-01-31 NOTE — ANESTHESIA POSTPROCEDURE EVALUATION
Anesthesia Post Evaluation    Patient: Per Salas    Procedure(s) Performed: Procedure(s) (LRB):  PYELOGRAM, RETROGRADE (Left)  URETEROSCOPY (Left)  URETEROSCOPY, WITH LASER LITHOTRIPSY (Left)  REMOVAL, CALCULUS, URETER (Left)  CYSTOSCOPY, WITH URETERAL STENT INSERTION (Left)  REMOVAL-STENT (Left)  NEPHROSCOPY (Left)    Final Anesthesia Type: general      Patient location during evaluation: PACU  Patient participation: Yes- Able to Participate  Level of consciousness: awake and alert  Post-procedure vital signs: reviewed and stable  Pain management: adequate  Airway patency: patent    PONV status at discharge: No PONV  Anesthetic complications: no      Cardiovascular status: blood pressure returned to baseline  Respiratory status: unassisted  Hydration status: euvolemic  Follow-up not needed.          Vitals Value Taken Time   /54 01/31/25 1001   Temp  01/31/25 1205   Pulse 82 01/31/25 1009   Resp 20 01/31/25 1009   SpO2 95 % 01/31/25 1009   Vitals shown include unfiled device data.      No case tracking events are documented in the log.      Pain/Brody Score: Brody Score: 10 (1/31/2025  9:40 AM)

## 2025-01-31 NOTE — TRANSFER OF CARE
Anesthesia Transfer of Care Note    Patient: Per Salas    Procedure(s) Performed: Procedure(s) (LRB):  PYELOGRAM, RETROGRADE (Left)  URETEROSCOPY (Left)  URETEROSCOPY, WITH LASER LITHOTRIPSY (Left)  REMOVAL, CALCULUS, URETER (Left)  CYSTOSCOPY, WITH URETERAL STENT INSERTION (Left)  REMOVAL-STENT (Left)  NEPHROSCOPY (Left)    Patient location: PACU    Transport from OR: Transported from OR on 6-10 L/min O2 by face mask with adequate spontaneous ventilation    Post pain: adequate analgesia    Post assessment: no apparent anesthetic complications    Post vital signs: stable    Level of consciousness: sedated and responds to stimulation    Nausea/Vomiting: no nausea/vomiting    Complications: none    Transfer of care protocol was followed    Last vitals: Visit Vitals  BP (!) 149/75   Pulse 98   Temp 37.2 °C (99 °F) (Temporal)   Resp 14   Ht 6' (1.829 m)   Wt 117.5 kg (259 lb)   SpO2 97%   BMI 35.13 kg/m²

## 2025-01-31 NOTE — ANESTHESIA PROCEDURE NOTES
Intubation    Date/Time: 1/31/2025 8:06 AM    Performed by: Daisy Lara CRNA  Authorized by: Olivier Romero MD    Intubation:     Induction:  Intravenous    Intubated:  Postinduction    Mask Ventilation:  Easy mask    Attempts:  1    Attempted By:  Student    Method of Intubation:  Video laryngoscopy    Blade:  Sheets 3    Laryngeal View Grade: Grade I - full view of cords      Difficult Airway Encountered?: No      Complications:  None    Airway Device:  Oral endotracheal tube    Airway Device Size:  7.5    Style/Cuff Inflation:  Cuffed (inflated to minimal occlusive pressure)    Tube secured:  22    Secured at:  The lips    Placement Verified By:  Capnometry    Complicating Factors:  None    Findings Post-Intubation:  BS equal bilateral and atraumatic/condition of teeth unchanged

## 2025-01-31 NOTE — PLAN OF CARE
Patient ambulated onto the unit . Patient appears to be in no immediate distress. Patient prepared for surgery. Perioperative period discussed and all questions addressed. Family at the bedside, call bell within reach, and bed in lowest position.

## 2025-01-31 NOTE — OP NOTE
[facility]  Operative Note    Date: 01/31/2025    Pre-Op Diagnosis: left ureteral stone     Patient Active Problem List    Diagnosis Date Noted    Ureteral stone 12/17/2024    FACUNDO (acute kidney injury) 12/17/2024    Class 2 obesity with body mass index (BMI) of 38.0 to 38.9 in adult 11/15/2024       Post-Op Diagnosis: same     Procedure(s) Performed:   1. Left ureteroscopy with laser lithotripsy and stone basket extraction  2. Cystoscopy  3. Left retrograde pyelogram   4. Left nephroscopy   5. Fluoro < 1 hr    Specimen(s): stone sent for analysis     Staff Surgeon: Tj Rodrigues MD    Assistant Surgeon: Cassandra López MD; Kaitlin Reese DO    Anesthesia: General endotracheal anesthesia    Indications: Per Salas is a 34 y.o. male with a left ureteral stone and left kidney stones presenting for definitive stone management. He is pre-stented.    Findings:  1. Left proximal ureteral stone visible on  film.  2. Left proximal ureteral stone and left lower pole stones lasered and basket extracted to completion.      Estimated Blood Loss: min    Drains:   1. Left 6 Fr x 28 cm JJ ureteral stent without strings    Procedure in detail:  After risks, benefits, and possible complications were explained, the patient elected to undergo the procedure and informed consent was obtained. All questions were answered in the magalis-operative area. The patient was transferred to the cystoscopy suite and placed in the supine position. SCDs were applied and working. Anesthesia was administered. The patient was then placed in the dorsal lithotomy position and prepped and draped in the usual sterile fashion. Time out was performed, and magalis-procedural antibiotics were confirmed.     The stone was visible on  film. A rigid cystoscope in a 22 Fr sheath was introduced into the patient's urethra. This passed easily. The entire urethra was visualized which showed no strictures or masses. Cystoscopy revealed the ureteral orifices  in the normal anatomic location bilaterally.    A motion wire was passed up the left ureteral orifice and up into the kidney. This passed easily and placement was confirmed fluoroscopically. The cystoscope was removed keeping the wire in place.    The patient's left ureteral stent was grasped with alligator graspers and brought to the meatus. A motion wire was passed through the stent and into the kidney with fluoroscopic confirmation. The stent was was removed keeping the wire in place.    An 8 Fr flexible ureteroscope was advanced over the glide wire to the level of the mid ureter under continuous fluoroscopy. This passed easily. The glide wire was removed.    A stone was encountered at the level of the proximal ureter. The glide wire was then replaced through the ureteroscope.  A 12/14 Fr 45 cm ureteral access sheath was advanced over the glide wire to the level of the mid ureter under continuous fluoroscopy. This passed easily. The inner dilator and glide wire were removed keeping the outer sheath in place. An 8 Fr flexible ureteroscope was advanced through the access sheath and into the kidney.     A 272 micron Thulium laser fiber was passed through the ureteroscope. The stone was fragmented using the laser. The laser fiber was removed and a tipless basket was introduced through the ureteroscope. Stone fragments were removed through the access sheath. The ureteroscope was reinserted and the entire length of the ureter was surveyed and no additional stone fragments were visualized.     We then entered the kidney with the ureteroscope.  There were two large lower pole stones that were moved to the upper pole with a basket.      The laser fiber was inserted per the scope and the stones were fragmented. An NCircle basket was inserted per the scope and fragments were removed and passed off the field for analysis. Systematic pyeloscopy was performed and all remaining stone fragments were deemed small enough to pass  spontaneously, <1 mm.  There were no basketable fragments remaining. The scope and ureteral access sheath were removed keeping the motion wire in place.    The cystoscope was backloaded over the wire and advanced into the bladder. We then passed a 6 Fr x 28 cm JJ ureteral stent without strings over the wire to the level of the renal pelvis under direct vision as well as flouroscopy. The wire was removed. A 180 degree coil was observed in the renal pelvis as well as the bladder using fluoroscopy. A 180 degree coil was also seen using direct visualization in the bladder.    The patient tolerated the procedure well and was transferred to the recovery room in stable condition.    Disposition:  The patient will be discharged home from PACU. He follow up with Dr. Rodrigues  in 2 weeks for stent removal.      Cassandra López MD

## 2025-01-31 NOTE — BRIEF OP NOTE
[facility] - Surgery ([facility])  Brief Operative Note    Surgery Date: 1/31/2025     Surgeons and Role:     * Tj Rodrigues MD - Primary     * Cassandra López MD - Resident - Assisting     * Kaitlin Reese MD - Resident - Assisting        Pre-op Diagnosis:  Ureteral stone [N20.1]    Post-op Diagnosis:  Post-Op Diagnosis Codes:     * Ureteral stone [N20.1]    Procedure(s) (LRB):  PYELOGRAM, RETROGRADE (Left)  URETEROSCOPY (Left)  URETEROSCOPY, WITH LASER LITHOTRIPSY (Left)  REMOVAL, CALCULUS, URETER (Left)  CYSTOSCOPY, WITH URETERAL STENT INSERTION (Left)  REMOVAL-STENT (Left)  NEPHROSCOPY (Left)    Anesthesia: General    Operative Findings: L URS with laser litho and stone extraction    Estimated Blood Loss: min          Specimens:   Specimen (24h ago, onward)      None              Discharge Note    OUTCOME: Patient tolerated treatment/procedure well without complication and is now ready for discharge.    DISPOSITION: Home or Self Care    FINAL DIAGNOSIS:  nephrolithiasis    FOLLOWUP: In clinic

## 2025-02-10 LAB
COMPN STONE: NORMAL
LABORATORY COMMENT REPORT: NORMAL
SPECIMEN SOURCE: NORMAL
STONE ANALYSIS IR-IMP: NORMAL

## 2025-02-21 ENCOUNTER — TELEPHONE (OUTPATIENT)
Dept: UROLOGY | Facility: CLINIC | Age: 35
End: 2025-02-21
Payer: COMMERCIAL

## 2025-02-21 NOTE — TELEPHONE ENCOUNTER
LMOR for pt re: confirming procedure appt for Monday  LM with location & arrival time for 12:30 if pt can come in earlier.

## 2025-02-24 ENCOUNTER — PROCEDURE VISIT (OUTPATIENT)
Dept: UROLOGY | Facility: CLINIC | Age: 35
End: 2025-02-24
Payer: COMMERCIAL

## 2025-02-24 VITALS
TEMPERATURE: 98 F | RESPIRATION RATE: 18 BRPM | SYSTOLIC BLOOD PRESSURE: 133 MMHG | HEART RATE: 110 BPM | BODY MASS INDEX: 37.01 KG/M2 | HEIGHT: 72 IN | WEIGHT: 273.25 LBS | DIASTOLIC BLOOD PRESSURE: 80 MMHG

## 2025-02-24 DIAGNOSIS — N20.0 NEPHROLITHIASIS: ICD-10-CM

## 2025-02-24 RX ORDER — SULFAMETHOXAZOLE AND TRIMETHOPRIM 800; 160 MG/1; MG/1
1 TABLET ORAL
Status: COMPLETED | OUTPATIENT
Start: 2025-02-24 | End: 2025-02-24

## 2025-02-24 RX ORDER — LIDOCAINE HYDROCHLORIDE 20 MG/ML
JELLY TOPICAL ONCE
Status: COMPLETED | OUTPATIENT
Start: 2025-02-24 | End: 2025-02-24

## 2025-02-24 RX ADMIN — LIDOCAINE HYDROCHLORIDE 5 ML: 20 JELLY TOPICAL at 12:02

## 2025-02-24 RX ADMIN — SULFAMETHOXAZOLE AND TRIMETHOPRIM 1 TABLET: 800; 160 TABLET ORAL at 12:02

## 2025-02-24 NOTE — PATIENT INSTRUCTIONS
What to Expect After a Cystoscopy with Stent Removal  For the next 24-48 hours, you may feel a mild burning when you urinate. This burning is normal and expected. Drink plenty of water to dilute the urine to help relieve the burning sensation. You may also see a small amount of blood in your urine after the procedure.    Unless you are already taking antibiotics, you may be given an antibiotic after the test to prevent infection.    Signs and Symptoms to Report  Call the [facility] at [phone] if you develop any of the following:  Fever of 101 degrees or higher  Chills or persistent bleeding  Inability to urinate    After hours or on weekends, you may reach a urology resident on call at this number: [phone].

## 2025-02-24 NOTE — PROCEDURES
CYSTOSCOPY W/ STENT REMOVAL    Date/Time: 2/24/2025 1:00 PM    Performed by: Tj Rodrigues MD  Authorized by: Cassandra López MD      Office Cystoscopy with Stent Removal Procedure Note    Date of Procedure: 02/24/2025    Indication:  Indwelling Ureteral Stent     Informed consent:  The risks, benefits, complications, treatment options, and expected outcomes were discussed with the patient. The patient concurred with the proposed plan and provided informed consent.     Prior to the procedure, I reviewed pertinent imaging and operative notes, confirming that the patient has 1 ureteral stent    Anesthesia: Lidocaine jelly 2%     Antibiotic prophylaxis:  Bactrim    Procedure:  The patient was placed in the lithotomy position, was prepped and draped in the usual manner using sterile technique, and 2% lidocaine jelly instilled into the urethra.  A procedural timeout was performed identifying the patient, all in attendance were in agreement, including the patient. A 17 F flexible cystoscope was then inserted into the urethra and the urethra and bladder carefully examined.  The findings below were noted. The stent was grasped using flexible gasping forceps, and removed intact through the meatus. The stent was examined on the backtable to confirm no fragmentation, and that the stent was removed in its entirety.    Findings:   1. Normal anterior urethra without evidence of strictures or tumors   2. Prostatic urethra open without signs of obstruction  3. Bladder free of masses, tumors, lesions.  Ureteral orifices in orthotopic position bilaterally        Specimens: None   Complications: None; patient tolerated the procedure well          Disposition: Home after brief observation   Condition: Stable     Assessment:  34-year-old male status post ureteroscopy and laser lithotripsy with stent placement    Plan:  Follow up in 6 weeks with a renal ultrasound, we will discuss Litholink at that time    Attending Attestation:      I  personally performed the procedure.     Tj Rodrigues  1:16 PM  02/24/2025

## 2025-02-27 ENCOUNTER — PATIENT MESSAGE (OUTPATIENT)
Dept: UROLOGY | Facility: CLINIC | Age: 35
End: 2025-02-27
Payer: COMMERCIAL

## 2025-04-07 ENCOUNTER — HOSPITAL ENCOUNTER (OUTPATIENT)
Dept: RADIOLOGY | Facility: HOSPITAL | Age: 35
Discharge: HOME | End: 2025-04-07
Attending: UROLOGY
Payer: COMMERCIAL

## 2025-04-07 DIAGNOSIS — N20.0 NEPHROLITHIASIS: ICD-10-CM

## 2025-04-07 PROCEDURE — 76775 US EXAM ABDO BACK WALL LIM: CPT | Mod: TC

## 2025-04-07 PROCEDURE — 76775 US EXAM ABDO BACK WALL LIM: CPT | Mod: 26,,, | Performed by: RADIOLOGY

## 2025-04-16 ENCOUNTER — OFFICE VISIT (OUTPATIENT)
Dept: UROLOGY | Facility: CLINIC | Age: 35
End: 2025-04-16
Payer: COMMERCIAL

## 2025-04-16 VITALS
HEART RATE: 81 BPM | DIASTOLIC BLOOD PRESSURE: 81 MMHG | WEIGHT: 280.44 LBS | SYSTOLIC BLOOD PRESSURE: 130 MMHG | BODY MASS INDEX: 37.99 KG/M2 | HEIGHT: 72 IN

## 2025-04-16 DIAGNOSIS — N20.0 NEPHROLITHIASIS: Primary | ICD-10-CM

## 2025-04-16 PROCEDURE — 3079F DIAST BP 80-89 MM HG: CPT | Mod: CPTII,S$GLB,, | Performed by: UROLOGY

## 2025-04-16 PROCEDURE — 3075F SYST BP GE 130 - 139MM HG: CPT | Mod: CPTII,S$GLB,, | Performed by: UROLOGY

## 2025-04-16 PROCEDURE — 99214 OFFICE O/P EST MOD 30 MIN: CPT | Mod: S$GLB,,, | Performed by: UROLOGY

## 2025-04-16 PROCEDURE — G2211 COMPLEX E/M VISIT ADD ON: HCPCS | Mod: S$GLB,,, | Performed by: UROLOGY

## 2025-04-16 PROCEDURE — 99999 PR PBB SHADOW E&M-EST. PATIENT-LVL III: CPT | Mod: PBBFAC,,, | Performed by: UROLOGY

## 2025-04-16 PROCEDURE — 1159F MED LIST DOCD IN RCRD: CPT | Mod: CPTII,S$GLB,, | Performed by: UROLOGY

## 2025-04-16 PROCEDURE — 3008F BODY MASS INDEX DOCD: CPT | Mod: CPTII,S$GLB,, | Performed by: UROLOGY

## 2025-04-16 NOTE — PROGRESS NOTES
[facility]  Urologic Oncology      Date of Service: 04/16/2025    Urologic Oncology Problem List:  Urolithiasis s/p left ureteral stent placement and retrograde pyelogram on 12/18/2024     History of Present Illness:   History of Present Illness    CHIEF COMPLAINT:  Mr. Salas presents today for follow up of kidney stones    HISTORY OF PRESENT ILLNESS:  He experiences intermittent sensations on the right side without consistent pattern. He had a previous ureteroscopy where all accessible stones were removed. Stone analysis revealed calcium phosphate and calcium oxalate composition.    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         Imaging: I have reviewed the imaging study renal ultrasound personally, have independently interpreted this study. Left renal stone is likely an intrarenal calcification.     Allergies:  Review of patient's allergies indicates:  No Known Allergies     Medications per EMR:  Prescriptions Prior to Admission[1]    Past Medical History:  No past medical history on file.     Past Surgical History:  Past Surgical History:   Procedure Laterality Date    CYSTOSCOPY W/ URETERAL STENT PLACEMENT Left 12/18/2024    Procedure: CYSTOSCOPY, WITH URETERAL STENT INSERTION;  Surgeon: Tj Rodrigues MD;  Location: [address];  Service: Urology;  Laterality: Left;    CYSTOSCOPY W/ URETERAL STENT PLACEMENT Left 1/31/2025    Procedure: CYSTOSCOPY, WITH URETERAL STENT INSERTION;  Surgeon: Tj Rodrigues MD;  Location: [facility] OR [address] [place] [address];  Service: Urology;  Laterality: Left;    NEPHROSCOPY Left 1/31/2025    Procedure: NEPHROSCOPY;  Surgeon: Tj Rodrigues MD;  Location: [facility] OR [address] [place] [address];  Service: Urology;  Laterality: Left;    REMOVAL OF URETERAL CALCULUS Left 1/31/2025    Procedure: REMOVAL, CALCULUS, URETER;  Surgeon: Tj Rodrigues MD;  Location: [facility] OR [address] [place] [address];  Service: Urology;   Laterality: Left;    REMOVAL-STENT Left 1/31/2025    Procedure: REMOVAL-STENT;  Surgeon: Tj Rodrigues MD;  Location: [facility] OR 1ST FLR;  Service: Urology;  Laterality: Left;    RETROGRADE PYELOGRAPHY Left 12/18/2024    Procedure: PYELOGRAM, RETROGRADE;  Surgeon: Tj Rodrigues MD;  Location: [facility] OR 2ND FLR;  Service: Urology;  Laterality: Left;    RETROGRADE PYELOGRAPHY Left 1/31/2025    Procedure: PYELOGRAM, RETROGRADE;  Surgeon: Tj Rodrigues MD;  Location: [facility] OR 1ST FLR;  Service: Urology;  Laterality: Left;    URETEROSCOPY Left 1/31/2025    Procedure: URETEROSCOPY;  Surgeon: Tj Rodrigues MD;  Location: [facility] OR 1ST FLR;  Service: Urology;  Laterality: Left;    URETEROSCOPY, WITH LASER LITHOTRIPSY Left 1/31/2025    Procedure: URETEROSCOPY, WITH LASER LITHOTRIPSY;  Surgeon: Tj Rodrigues MD;  Location: [facility] OR 1ST FLR;  Service: Urology;  Laterality: Left;        Family History:  Family History   Problem Relation Name Age of Onset    Breast cancer Mother      Heart attack Father      Ovarian cancer Maternal Grandmother      Colon cancer Maternal Uncle          Social History:  Social History     Tobacco Use    Smoking status: Never    Smokeless tobacco: Never   Substance Use Topics    Alcohol use: Not Currently          OBJECTIVE:     Vitals:    04/16/25 1419   BP: 130/81   BP Location: Right arm   Patient Position: Sitting   Pulse: 81   Weight: 127.2 kg (280 lb 6.8 oz)   Height: 6' (1.829 m)        Physical Exam    General: No acute distress. Nontoxic appearing.  HENT: Normocephalic. Atraumatic.  Respiratory: Normal respiratory effort. No conversational dyspnea. No audible wheezing.  Abdomen: No obvious distension.  Skin: No visible abnormalities.  Extremities: No edema upper extremities. No edema lower extremities.  Neurological: Alert and oriented x3. Normal speech.  Psychiatric: Normal mood. Normal affect. No evidence of SI.           LABS:    CBC:  Lab Results   Component Value Date    WBC 8.26  12/18/2024    HGB 14.2 12/18/2024    HCT 45.1 12/18/2024    MCV 90 12/18/2024     12/18/2024         BMP:  Lab Results   Component Value Date     12/18/2024    K 4.0 12/18/2024     12/18/2024    CO2 25 12/18/2024    BUN 15 12/18/2024    CREATININE 1.9 (H) 12/18/2024    CALCIUM 8.7 12/18/2024    ANIONGAP 6 (L) 12/18/2024    EGFRNORACEVR 47.5 (A) 12/18/2024         ASSESSMENT/PLAN:     Assessment & Plan    KIDNEY STONES:  - US showed no swelling in either kidney.  - Small stone noted in left kidney on ultrasound, but may be in renal parenchyma and unlikely to cause issues.  - Right kidney has 6 mm stone, but not currently obstructing ureter.  - Previous ureteroscopy cleaned out all accessible stones in affected kidney.  - Stones are calcium-based (calcium phosphate and calcium oxalate).  - Decided against prophylactic ureteroscopy for right kidney stone due to risks associated with procedure.  - Explained importance of increasing fluid intake to prevent stone formation.  - Discussed relationship between salt intake and calcium excretion in urine.  - Educated on impact of non-dairy animal protein on uric acid load and urine acidity.  - Provided information on 48-hour urine test process and its importance in guiding treatment.  - Mr. Salas to increase fluid intake to produce 2.5 L of urine per day but maintain current hydration habits until after completing 48-hour urine test.  - Recommend decreasing non-dairy animal protein intake.  - Recommend reducing salt intake.  - Ordered 48-hour urine test to be mailed to patient's home.  - Consider referral to one of two stone specialists (Dr. Woods or Dr. Cage) after 48-hour urine test results are received.    FOLLOW-UP:  - Contact the office if 48-hour urine test kit not received within 2 weeks.  - Follow up to be scheduled after 48-hour urine test results are reviewed.           I spent a total of 30 minutes on the day of the visit.This includes  face to face time and non-face to face time preparing to see the patient (eg, review of tests), obtaining and/or reviewing separately obtained history, documenting clinical information in the electronic or other health record, independently interpreting results and communicating results to the patient/family/caregiver, or care coordinator    - code applied: patient requires or will require a continuous, longitudinal, and active collaborative plan of care related to this patient's health condition, nephrolithiasis --the management of which requires the direction of a practitioner with specialized clinical knowledge, skill, and expertise.     This encounter was dictated and transcribed using DeepScribe and Fluency[employer], please excuse any typographical or grammatical errors.           [1] (Not in a hospital admission)

## 2025-07-01 ENCOUNTER — OFFICE VISIT (OUTPATIENT)
Dept: UROLOGY | Facility: CLINIC | Age: 35
End: 2025-07-01
Payer: COMMERCIAL

## 2025-07-01 VITALS
HEIGHT: 72 IN | SYSTOLIC BLOOD PRESSURE: 125 MMHG | WEIGHT: 280 LBS | HEART RATE: 79 BPM | DIASTOLIC BLOOD PRESSURE: 79 MMHG | BODY MASS INDEX: 37.93 KG/M2

## 2025-07-01 DIAGNOSIS — E66.812 CLASS 2 OBESITY WITHOUT SERIOUS COMORBIDITY WITH BODY MASS INDEX (BMI) OF 38.0 TO 38.9 IN ADULT, UNSPECIFIED OBESITY TYPE: Primary | ICD-10-CM

## 2025-07-01 DIAGNOSIS — N20.0 NEPHROLITHIASIS: ICD-10-CM

## 2025-07-01 PROCEDURE — 99214 OFFICE O/P EST MOD 30 MIN: CPT | Mod: S$GLB,,, | Performed by: UROLOGY

## 2025-07-01 PROCEDURE — 99999 PR PBB SHADOW E&M-EST. PATIENT-LVL III: CPT | Mod: PBBFAC,,, | Performed by: UROLOGY

## 2025-07-01 PROCEDURE — 3074F SYST BP LT 130 MM HG: CPT | Mod: CPTII,S$GLB,, | Performed by: UROLOGY

## 2025-07-01 PROCEDURE — 1159F MED LIST DOCD IN RCRD: CPT | Mod: CPTII,S$GLB,, | Performed by: UROLOGY

## 2025-07-01 PROCEDURE — 1160F RVW MEDS BY RX/DR IN RCRD: CPT | Mod: CPTII,S$GLB,, | Performed by: UROLOGY

## 2025-07-01 PROCEDURE — 3078F DIAST BP <80 MM HG: CPT | Mod: CPTII,S$GLB,, | Performed by: UROLOGY

## 2025-07-01 PROCEDURE — 3008F BODY MASS INDEX DOCD: CPT | Mod: CPTII,S$GLB,, | Performed by: UROLOGY

## 2025-07-02 NOTE — PROGRESS NOTES
Urology - [facility]  Clinic Note    SUBJECTIVE:     Chief Complaint: kidney stones    History of Present Illness:  Per Salas is a 35 y.o. male who presents to clinic for kidney stones. He is established to our clinic to our clinic. Referral from Tj Rodrigues MD     History of Present Illness    Patient presents today for follow up of kidney stones.    He has a history of kidney stones first occurring at age 24. Recent December CT showed multiple kidney stones: left kidney had a ureteral stone (likely two back-to-back) with three additional stones removed during intervention. Right kidney had at least one confirmed stone with potential additional small stones. Follow-up ultrasound on April 7th showed a definitive stone in the right kidney with possibility of an additional stone. Left kidney findings were inconclusive due to minimal shadowing.    Previous stone analysis revealed mixed calcium composition: 50% calcium oxalate monohydrate, 40% calcium phosphate, and remaining dihydrate component. He had previous stent placement which was characterized by spasming into the kidney during urination, but overall tolerated the procedure well with improvement post-procedure.    Father has history of kidney stones. Paternal grandfather had urology issues, primarily related to prostate.    He consumes spinach 2-3 times weekly and almonds daily. He drinks alternative milk products and occasionally consumes cheese, but avoids whole milk and 2% milk. He recently experienced hydration issues from drinking more Gatorade than water, but is currently working to improve hydration with goal of clear urine.    He recently completed two metabolic tests using a home testing kit and is awaiting results.               Anticoagulation:  No    OBJECTIVE:     Estimated body mass index is 37.97 kg/m² as calculated from the following:    Height as of this encounter: 6' (1.829 m).    Weight as of this encounter: 127 kg (279 lb  15.8 oz).    Vital Signs (Most Recent)  Pulse: 79 (07/01/25 1429)  BP: 125/79 (07/01/25 1429)    Physical Exam    Lab Results   Component Value Date    BUN 15 12/18/2024    CREATININE 1.9 (H) 12/18/2024    WBC 8.26 12/18/2024    HGB 14.2 12/18/2024    HCT 45.1 12/18/2024     12/18/2024    AST 25 12/17/2024    ALT 32 12/17/2024    ALKPHOS 82 12/17/2024    ALBUMIN 4.1 12/17/2024    HGBA1C 5.6 11/15/2024            Imaging:films personally reviewed  Right kidney: The right kidney measures 11.5 cm.  No cortical thinning.  No loss of corticomedullary distinction.  Resistive index measures 0.59.  No mass. Four renal stones measuring up to 6 mm.  No hydronephrosis.     Left kidney: The left kidney measures 11.6 cm.  No cortical thinning. No loss of corticomedullary distinction.  Resistive index measures 0.64.  No mass. 4 mm renal stone.  No hydronephrosis.        ASSESSMENT     1. Class 2 obesity without serious comorbidity with body mass index (BMI) of 38.0 to 38.9 in adult, unspecified obesity type    2. Nephrolithiasis      PLAN:   Diagnoses and all orders for this visit:    Class 2 obesity without serious comorbidity with body mass index (BMI) of 38.0 to 38.9 in adult, unspecified obesity type    Nephrolithiasis  -     Ambulatory referral/consult to Urology  -     POCT Urinalysis(Instrument)  -     X-Ray Abdomen AP 1 View; Future        Assessment & Plan    N20.2 Calculus of kidney with calculus of ureter  N20.0 Calculus of kidney  E21.3 Hyperparathyroidism, unspecified  Z84.2 Family history of other diseases of the genitourinary system    KIDNEY AND URETERAL CALCULI:  - Reviewed December CT Kidneys showing multiple kidney stones on both sides.  - Analyzed April 7th renal US results, noting accuracy for right side stones but questioning left side findings.  - Stone analysis results: 50% calcium oxalate monohydrate, 40% calcium phosphate, 10% calcium oxalate dihydrate.  - Metabolic workup needed due to multiple  stones and mixed composition.  - Treatment options for right-side stones: watchful waiting vs. ureteroscopy.  - Ureteroscopy may require stent placement depending on ureter tightness.  - Informed about potential for stone passage and associated ER visit risk.  - Explained ureteroscopy procedure, including possible need for stent placement.  - Recommend reducing spinach intake to no more than once a week.  - Recommend decreasing almond consumption.  - Recommend increasing fluid intake to 2-3 L of water per day.  - Recommend maintaining calcium in diet through alternative sources if not consuming dairy.  - Recommend avoiding excessive use of NSAIDs like ibuprofen.  - Continued Flomax (tamsulosin) as needed for flank pain or to assist in passing stones.    POTENTIAL HYPERPARATHYROIDISM:  - Hyperparathyroidism possible cause for metabolic stones.  - Ordered PTH (parathyroid hormone) test.    DIETARY RECOMMENDATIONS FOR STONE PREVENTION:  - Explained high oxalate content in spinach (755 mg) compared to other foods.  - Discussed importance of calcium in diet for stone prevention.         This note was generated with the assistance of ambient listening technology. Verbal consent was obtained by the patient and accompanying visitor(s) for the recording of patient appointment to facilitate this note. I attest to having reviewed and edited the generated note for accuracy, though some syntax or spelling errors may persist. Please contact the author of this note for any clarification.    Consider ureteroscopy in the future.     Lindsay Cage MD     Letter to Tj Rodrigues MD     I spent 25 minutes with the patient of which more than half was spent in direct consultation with the patient in regards to our treatment and plan.

## (undated) DEVICE — UNDERGLOVES BIOGEL PI SIZE 7.5

## (undated) DEVICE — SET IRR URLGY 2LINE UNIV SPIKE

## (undated) DEVICE — TRAY CYSTO BASIN OMC

## (undated) DEVICE — SHEATH URET FLEXOR 12FR 45CM

## (undated) DEVICE — PACK LITHOTOMY I ECLIPSE

## (undated) DEVICE — SOL IRR WATER STRL 3000 ML

## (undated) DEVICE — EXTRACTOR TIPLESS 2.4FRX1115CM

## (undated) DEVICE — DRAPE UINDERBUT GRAD PCH

## (undated) DEVICE — GUIDE WIRE MOTION .035 X 150CM

## (undated) DEVICE — SOL NACL IRR 3000ML

## (undated) DEVICE — PACK CYSTOSCOPY III SIRUS

## (undated) DEVICE — SYR 10CC LUER LOCK

## (undated) DEVICE — LUBRICANT SURGILUBE 2 OZ

## (undated) DEVICE — TOWEL OR DISP STRL BLUE 4/PK

## (undated) DEVICE — FIBER QUANTA OPT SDT 272UM

## (undated) DEVICE — SYR ONLY LUER LOCK 20CC

## (undated) DEVICE — TRAY SKIN SCRUB WET PREMIUM

## (undated) DEVICE — GUIDEWIRE STR TIP HIWIRE 150CM

## (undated) DEVICE — CATH POLLACK OPEN-END FLEXI-TI

## (undated) DEVICE — GOWN SURGICAL X-LARGE